# Patient Record
Sex: MALE | Race: WHITE | NOT HISPANIC OR LATINO
[De-identification: names, ages, dates, MRNs, and addresses within clinical notes are randomized per-mention and may not be internally consistent; named-entity substitution may affect disease eponyms.]

---

## 2022-11-21 PROBLEM — Z00.00 ENCOUNTER FOR PREVENTIVE HEALTH EXAMINATION: Status: ACTIVE | Noted: 2022-11-21

## 2022-11-22 ENCOUNTER — APPOINTMENT (OUTPATIENT)
Dept: INFECTIOUS DISEASE | Facility: CLINIC | Age: 52
End: 2022-11-22

## 2022-11-22 ENCOUNTER — OUTPATIENT (OUTPATIENT)
Dept: OUTPATIENT SERVICES | Facility: HOSPITAL | Age: 52
LOS: 1 days | End: 2022-11-22

## 2022-11-22 ENCOUNTER — LABORATORY RESULT (OUTPATIENT)
Age: 52
End: 2022-11-22

## 2022-11-22 VITALS
BODY MASS INDEX: 22.38 KG/M2 | HEIGHT: 75 IN | DIASTOLIC BLOOD PRESSURE: 97 MMHG | WEIGHT: 180 LBS | TEMPERATURE: 98.7 F | SYSTOLIC BLOOD PRESSURE: 145 MMHG | OXYGEN SATURATION: 98 % | RESPIRATION RATE: 14 BRPM | HEART RATE: 82 BPM

## 2022-11-22 DIAGNOSIS — Z11.3 ENCOUNTER FOR SCREENING FOR INFECTIONS WITH A PREDOMINANTLY SEXUAL MODE OF TRANSMISSION: ICD-10-CM

## 2022-11-22 DIAGNOSIS — B00.1 HERPESVIRAL VESICULAR DERMATITIS: ICD-10-CM

## 2022-11-22 DIAGNOSIS — R03.0 ELEVATED BLOOD-PRESSURE READING, W/OUT DIAGNOSIS OF HYPERTENSION: ICD-10-CM

## 2022-11-22 DIAGNOSIS — Z23 ENCOUNTER FOR IMMUNIZATION: ICD-10-CM

## 2022-11-22 PROCEDURE — 99204 OFFICE O/P NEW MOD 45 MIN: CPT | Mod: GC

## 2022-11-22 RX ORDER — PREDNISONE 10 MG/1
10 TABLET ORAL
Qty: 21 | Refills: 0 | Status: DISCONTINUED | COMMUNITY
Start: 2022-09-08

## 2022-11-22 RX ORDER — TRIAMCINOLONE ACETONIDE 1 MG/G
0.1 CREAM TOPICAL
Qty: 80 | Refills: 0 | Status: DISCONTINUED | COMMUNITY
Start: 2022-09-08

## 2022-11-22 NOTE — PHYSICAL EXAM
[PERRL] : pupils equal round and reactive to light [Supple] : supple [No Edema] : there was no peripheral edema [Grossly Normal Strength/Tone] : grossly normal strength/tone [No Rash] : no rash [Normal] : affect was normal and insight and judgment were intact

## 2022-11-23 LAB
ALBUMIN SERPL ELPH-MCNC: 4.8 G/DL
ALP BLD-CCNC: 56 U/L
ALT SERPL-CCNC: 20 U/L
ANION GAP SERPL CALC-SCNC: 14 MMOL/L
APPEARANCE: CLEAR
AST SERPL-CCNC: 22 U/L
BACTERIA: NEGATIVE
BASOPHILS # BLD AUTO: 0.03 K/UL
BASOPHILS NFR BLD AUTO: 0.5 %
BILIRUB SERPL-MCNC: 0.2 MG/DL
BILIRUBIN URINE: NEGATIVE
BLOOD URINE: NEGATIVE
BUN SERPL-MCNC: 10 MG/DL
C TRACH RRNA SPEC QL NAA+PROBE: NOT DETECTED
CALCIUM SERPL-MCNC: 9.5 MG/DL
CD3 CELLS # BLD: 1174 /UL
CD3 CELLS NFR BLD: 74 %
CD3+CD4+ CELLS # BLD: 297 /UL
CD3+CD4+ CELLS NFR BLD: 19 %
CD3+CD4+ CELLS/CD3+CD8+ CLL SPEC: 0.34 RATIO
CD3+CD8+ CELLS # SPEC: 877 /UL
CD3+CD8+ CELLS NFR BLD: 55 %
CHLORIDE SERPL-SCNC: 103 MMOL/L
CHOLEST SERPL-MCNC: 236 MG/DL
CO2 SERPL-SCNC: 24 MMOL/L
COLOR: NORMAL
CREAT SERPL-MCNC: 1.11 MG/DL
EGFR: 80 ML/MIN/1.73M2
EOSINOPHIL # BLD AUTO: 0.07 K/UL
EOSINOPHIL NFR BLD AUTO: 1.3 %
ESTIMATED AVERAGE GLUCOSE: 114 MG/DL
GLUCOSE QUALITATIVE U: NEGATIVE
GLUCOSE SERPL-MCNC: 72 MG/DL
HBA1C MFR BLD HPLC: 5.6 %
HBV CORE IGG+IGM SER QL: NONREACTIVE
HBV CORE IGM SER QL: NONREACTIVE
HBV SURFACE AB SER QL: REACTIVE
HBV SURFACE AG SER QL: NONREACTIVE
HCT VFR BLD CALC: 39.3 %
HDLC SERPL-MCNC: 46 MG/DL
HEPATITIS A IGG ANTIBODY: REACTIVE
HGB BLD-MCNC: 12.8 G/DL
HYALINE CASTS: 0 /LPF
IMM GRANULOCYTES NFR BLD AUTO: 0.2 %
KETONES URINE: NEGATIVE
LDLC SERPL CALC-MCNC: 153 MG/DL
LEUKOCYTE ESTERASE URINE: NEGATIVE
LYMPHOCYTES # BLD AUTO: 1.56 K/UL
LYMPHOCYTES NFR BLD AUTO: 28.2 %
MAN DIFF?: NORMAL
MCHC RBC-ENTMCNC: 30.5 PG
MCHC RBC-ENTMCNC: 32.6 GM/DL
MCV RBC AUTO: 93.8 FL
MICROSCOPIC-UA: NORMAL
MONOCYTES # BLD AUTO: 0.41 K/UL
MONOCYTES NFR BLD AUTO: 7.4 %
N GONORRHOEA RRNA SPEC QL NAA+PROBE: NOT DETECTED
NEUTROPHILS # BLD AUTO: 3.45 K/UL
NEUTROPHILS NFR BLD AUTO: 62.4 %
NITRITE URINE: NEGATIVE
NONHDLC SERPL-MCNC: 190 MG/DL
PH URINE: 7
PLATELET # BLD AUTO: 246 K/UL
POTASSIUM SERPL-SCNC: 4.2 MMOL/L
PROT SERPL-MCNC: 7.1 G/DL
PROTEIN URINE: NEGATIVE
RBC # BLD: 4.19 M/UL
RBC # FLD: 13.3 %
RED BLOOD CELLS URINE: 0 /HPF
SODIUM SERPL-SCNC: 141 MMOL/L
SOURCE AMPLIFICATION: NORMAL
SPECIFIC GRAVITY URINE: 1.01
SQUAMOUS EPITHELIAL CELLS: 0 /HPF
TRIGL SERPL-MCNC: 184 MG/DL
UROBILINOGEN URINE: NORMAL
WBC # FLD AUTO: 5.53 K/UL
WHITE BLOOD CELLS URINE: 0 /HPF

## 2022-11-23 NOTE — ASSESSMENT
[FreeTextEntry1] : HCM: Covid Vx x 5, last booster earlier this fall. Influenza Vx received this season. Monkeypox Vx x 2. Colonoscopy 12/2021 WNL repeat in 10 years. \par - PNA 20 valent administered today\par - Anal Pap at next visit

## 2022-11-23 NOTE — HISTORY OF PRESENT ILLNESS
[FreeTextEntry1] : New patient evaluation [de-identified] : 52M with PMH of HIV, HCV s/p treatment, herpes labialis, hairloss. Patient initially diagnoses in 1997, participated in many clinical trials. Patient notes to have been VLUD since 2 weeks after diagnosis of HIV. Per patient last labs with VLUD and CD4 count in the mid 300s. Patient was previously on atripla for many years with appropriate response, until being transitioned to Biktarvy 6 months ago. Since that time patient feels weight gain has been an issue. patient notes to have been treated for HCV in 2004, recalls being told that he had seroconverted, and that some degree of fibrosis had occurred prior to clearance. Denies ROS. \par \par Social History: patient recently moved back to [facility] from [facility], works as a consultant. Never smoker. Denies any passed drug use. Reports having 5 partners in the last 6 months, rarely uses protection. Consumes alcohol 4x per week. \par \par HCM: Covid Vx x 5, last booster earlier this fall. Monkeypox Vx x 2. Colonoscopy 12/2021 WNL repeat in 10 years.

## 2022-11-23 NOTE — REVIEW OF SYSTEMS
[Negative] : Psychiatric [Discharge] : no discharge [Pain] : no pain [Vision Problems] : no vision problems [Itching] : no itching [Hearing Loss] : no hearing loss [Nasal Discharge] : no nasal discharge [Sore Throat] : no sore throat

## 2022-11-23 NOTE — HEALTH RISK ASSESSMENT
[Never] : Never [Yes] : Yes [2 - 3 times a week (3 pts)] : 2 - 3  times a week (3 points) [1 or 2 (0 pts)] : 1 or 2 (0 points) [Never (0 pts)] : Never (0 points) [0] : 2) Feeling down, depressed, or hopeless: Not at all (0) [PHQ-2 Negative - No further assessment needed] : PHQ-2 Negative - No further assessment needed [Patient reported colonoscopy was normal] : Patient reported colonoscopy was normal [[id number]] : 0 [ColonoscopyDate] : 10/2021

## 2022-11-23 NOTE — END OF VISIT
[] : Resident [FreeTextEntry3] : I saw and evaluated the patient with resident. \par I performed a medication reconciliation and  reviewed vaccination history and other health care maintenance and prevention topics in the EMR.\par I assessed patient's adherence to medications especially ARVs ( % 100 ) and inquired about AEs ( None).\par \par Interested in [place] injectable ARVs\par labs- 3 site\par Declined pap\par RTC in 2 weeks\par \par \par \par

## 2022-11-25 DIAGNOSIS — R03.0 ELEVATED BLOOD-PRESSURE READING, WITHOUT DIAGNOSIS OF HYPERTENSION: ICD-10-CM

## 2022-11-25 DIAGNOSIS — B00.1 HERPESVIRAL VESICULAR DERMATITIS: ICD-10-CM

## 2022-11-25 DIAGNOSIS — B19.20 UNSPECIFIED VIRAL HEPATITIS C WITHOUT HEPATIC COMA: ICD-10-CM

## 2022-11-25 DIAGNOSIS — Z23 ENCOUNTER FOR IMMUNIZATION: ICD-10-CM

## 2022-11-25 DIAGNOSIS — Z11.3 ENCOUNTER FOR SCREENING FOR INFECTIONS WITH A PREDOMINANTLY SEXUAL MODE OF TRANSMISSION: ICD-10-CM

## 2022-11-25 DIAGNOSIS — B20 HUMAN IMMUNODEFICIENCY VIRUS [HIV] DISEASE: ICD-10-CM

## 2022-11-29 LAB
HCV RNA SERPL NAA+PROBE-LOG IU: NOT DETECTED LOGIU/ML
HEPC RNA INTERP: NOT DETECTED
HIV1 RNA # SERPL NAA+PROBE: NORMAL
HIV1 RNA # SERPL NAA+PROBE: NORMAL COPIES/ML
M TB IFN-G BLD-IMP: NEGATIVE
QUANTIFERON TB PLUS MITOGEN MINUS NIL: 9.7 IU/ML
QUANTIFERON TB PLUS NIL: 0.63 IU/ML
QUANTIFERON TB PLUS TB1 MINUS NIL: -0.03 IU/ML
QUANTIFERON TB PLUS TB2 MINUS NIL: -0.07 IU/ML
RPR SER-TITR: NORMAL
VIRAL LOAD INTERP: NORMAL
VIRAL LOAD LOG: NORMAL LG COP/ML

## 2022-11-30 ENCOUNTER — TRANSCRIPTION ENCOUNTER (OUTPATIENT)
Age: 52
End: 2022-11-30

## 2022-12-05 ENCOUNTER — OUTPATIENT (OUTPATIENT)
Dept: OUTPATIENT SERVICES | Facility: HOSPITAL | Age: 52
LOS: 1 days | End: 2022-12-05

## 2022-12-05 ENCOUNTER — APPOINTMENT (OUTPATIENT)
Dept: INFECTIOUS DISEASE | Facility: CLINIC | Age: 52
End: 2022-12-05

## 2022-12-05 PROCEDURE — 99213 OFFICE O/P EST LOW 20 MIN: CPT | Mod: 95

## 2022-12-05 RX ORDER — BICTEGRAVIR SODIUM, EMTRICITABINE, AND TENOFOVIR ALAFENAMIDE FUMARATE 50; 200; 25 MG/1; MG/1; MG/1
50-200-25 TABLET ORAL
Refills: 0 | Status: DISCONTINUED | COMMUNITY
Start: 2022-01-27 | End: 2022-12-05

## 2022-12-06 ENCOUNTER — TRANSCRIPTION ENCOUNTER (OUTPATIENT)
Age: 52
End: 2022-12-06

## 2022-12-06 NOTE — PLAN
[FreeTextEntry1] : 52M presents via  telehealth to discuss ARVs.\par \par HIV: Controlled.  Possibly having weight gain from TAF/INSTI.  Discussed options including switching from INSTI regimen to SEBAS based regimen.  But this will remove ASCENCIO as option.  Will trial Juluca to remove TAF/INSTI combo.  Monitor weight on DTG/RPV for 2-3 months.  If improved, will consider switch to ASCENCIO.  If not, will  switch to Descovy / SEBAS.\par \par Counseled on AM fatigue,  will monitor.\par Reviewed labs.\par \par I spent 25 minutes for the total time of this encounter, including time spent face-to-face with the patient, chart review, coordinating care, and documentation.

## 2022-12-06 NOTE — PHYSICAL EXAM
[No Acute Distress] : no acute distress [Well Developed] : well developed [Well-Appearing] : well-appearing [No Respiratory Distress] : no respiratory distress  [No Accessory Muscle Use] : no accessory muscle use [de-identified] : A&Ox3

## 2022-12-06 NOTE — HISTORY OF PRESENT ILLNESS
[Home] : at home, [unfilled] , at the time of the visit. [Medical Office: ([facility])___] : at the medical office located in  [Verbal consent obtained from patient] : the patient, [unfilled] [FreeTextEntry1] : HIV follow up [de-identified] : Discussed with patient: You have chosen to receive care through the use of tele-media. Tele-media enables health care providers at different locations to provide safe, effective, and convenient care through the use of technology. Please note this is a billable encounter. As with any health care service, there are risks associated with the use of tele-media, including equipment failure, poor image and/or resolution, and  issues. You understand that I cannot physically examine you and that you may need to come to the clinic to complete the assessment. Patient agreed verbally to understanding the risks and benefits of tele-media as explained. All questions regarding tele-media encounters were answered. \par  \par 52M presents via video call to discuss HIV labs and ARV options.\par \par On Biktarvy after switching  from Atripla.  Has noticed mid-section weight gain.  Tolerated Atripla  well prev.  No other acute adverse effects from  Biktarvy.  Has tried exercise, has clean diet, cannot lose the weight.\par \par Intersted in ASCENCIO.\par \par Also concerned about feeling  mild fatigue in mid-morning.  No other associated symptoms

## 2022-12-15 ENCOUNTER — NON-APPOINTMENT (OUTPATIENT)
Age: 52
End: 2022-12-15

## 2023-02-06 ENCOUNTER — OUTPATIENT (OUTPATIENT)
Dept: OUTPATIENT SERVICES | Facility: HOSPITAL | Age: 53
LOS: 1 days | End: 2023-02-06

## 2023-02-06 ENCOUNTER — APPOINTMENT (OUTPATIENT)
Dept: INFECTIOUS DISEASE | Facility: CLINIC | Age: 53
End: 2023-02-06
Payer: COMMERCIAL

## 2023-02-06 VITALS
WEIGHT: 180 LBS | RESPIRATION RATE: 12 BRPM | TEMPERATURE: 98.4 F | HEIGHT: 75 IN | BODY MASS INDEX: 22.38 KG/M2 | DIASTOLIC BLOOD PRESSURE: 78 MMHG | SYSTOLIC BLOOD PRESSURE: 130 MMHG | OXYGEN SATURATION: 96 % | HEART RATE: 90 BPM

## 2023-02-06 PROCEDURE — 99214 OFFICE O/P EST MOD 30 MIN: CPT

## 2023-02-07 DIAGNOSIS — B20 HUMAN IMMUNODEFICIENCY VIRUS [HIV] DISEASE: ICD-10-CM

## 2023-02-07 LAB
HIV1 RNA # SERPL NAA+PROBE: NORMAL
HIV1 RNA # SERPL NAA+PROBE: NORMAL COPIES/ML
VIRAL LOAD INTERP: NORMAL
VIRAL LOAD LOG: NORMAL LG COP/ML

## 2023-02-07 NOTE — PLAN
[FreeTextEntry1] : 52M presents for follow up\par \par HIV: Controlled.  Needs VL today to assess efficacy of DTG/RPV.  Weight improved w/ removal of TAF.  C/w monitoring.  No adverse effects noted.  Counseled on Cabenuva (logistics, risks/benefits).  Discussed insurance coverage, patient's [place] MCD will be active next month.\par \par Weight stable but w/ better fat distribution.\par \par RTC 1-2 months pending insurance auth for Cabenuva.\par \par I spent 35 minutes for the total time of this encounter, including time spent face-to-face with the patient, chart review, coordinating care, and documentation.

## 2023-02-07 NOTE — HISTORY OF PRESENT ILLNESS
[FreeTextEntry1] : HIV follow up [de-identified] : 52M presents for follow up\par \par Tolerating Juluca well.  Reports that weight dropped off after stopping Biktarvy.\par No mood or other adverse effects.  Feels well.  Interested in ASCENCIO pending insurance change.\par \par No acute complaints today.

## 2023-03-03 ENCOUNTER — TRANSCRIPTION ENCOUNTER (OUTPATIENT)
Age: 53
End: 2023-03-03

## 2023-03-07 ENCOUNTER — TRANSCRIPTION ENCOUNTER (OUTPATIENT)
Age: 53
End: 2023-03-07

## 2023-03-10 ENCOUNTER — APPOINTMENT (OUTPATIENT)
Dept: INFECTIOUS DISEASE | Facility: CLINIC | Age: 53
End: 2023-03-10
Payer: COMMERCIAL

## 2023-03-10 ENCOUNTER — OUTPATIENT (OUTPATIENT)
Dept: OUTPATIENT SERVICES | Facility: HOSPITAL | Age: 53
LOS: 1 days | End: 2023-03-10

## 2023-03-10 VITALS
WEIGHT: 180 LBS | OXYGEN SATURATION: 98 % | SYSTOLIC BLOOD PRESSURE: 120 MMHG | BODY MASS INDEX: 22.38 KG/M2 | HEART RATE: 96 BPM | TEMPERATURE: 97.2 F | DIASTOLIC BLOOD PRESSURE: 83 MMHG | HEIGHT: 75 IN

## 2023-03-10 PROCEDURE — 99214 OFFICE O/P EST MOD 30 MIN: CPT

## 2023-03-10 RX ORDER — CABOTEGRAVIR AND RILPIVIRINE 600-900/3
600 & 900 KIT INTRAMUSCULAR
Qty: 0 | Refills: 0 | Status: COMPLETED | OUTPATIENT
Start: 2023-03-10

## 2023-03-10 RX ORDER — DOLUTEGRAVIR SODIUM AND RILPIVIRINE HYDROCHLORIDE 50; 25 MG/1; MG/1
50-25 TABLET, FILM COATED ORAL
Qty: 30 | Refills: 1 | Status: DISCONTINUED | COMMUNITY
Start: 2022-12-05 | End: 2023-03-10

## 2023-03-10 RX ORDER — CABOTEGRAVIR SODIUM 30 MG/1
30 TABLET, FILM COATED ORAL
Qty: 30 | Refills: 0 | Status: DISCONTINUED | COMMUNITY
Start: 2023-03-07 | End: 2023-03-10

## 2023-03-10 RX ORDER — RILPIVIRINE HYDROCHLORIDE 25 MG/1
25 TABLET, FILM COATED ORAL
Qty: 30 | Refills: 0 | Status: DISCONTINUED | COMMUNITY
Start: 2023-03-07 | End: 2023-03-10

## 2023-03-10 RX ADMIN — CABOTEGRAVIR AND RILPIVIRINE 0 MG/3ML: KIT at 00:00

## 2023-03-13 DIAGNOSIS — L23.9 ALLERGIC CONTACT DERMATITIS, UNSPECIFIED CAUSE: ICD-10-CM

## 2023-03-13 DIAGNOSIS — B20 HUMAN IMMUNODEFICIENCY VIRUS [HIV] DISEASE: ICD-10-CM

## 2023-03-13 LAB
HIV1 RNA # SERPL NAA+PROBE: ABNORMAL
HIV1 RNA # SERPL NAA+PROBE: ABNORMAL COPIES/ML
VIRAL LOAD INTERP: NORMAL
VIRAL LOAD LOG: ABNORMAL LG COP/ML

## 2023-03-29 ENCOUNTER — TRANSCRIPTION ENCOUNTER (OUTPATIENT)
Age: 53
End: 2023-03-29

## 2023-04-06 ENCOUNTER — RX RENEWAL (OUTPATIENT)
Age: 53
End: 2023-04-06

## 2023-04-07 ENCOUNTER — APPOINTMENT (OUTPATIENT)
Dept: INFECTIOUS DISEASE | Facility: CLINIC | Age: 53
End: 2023-04-07
Payer: COMMERCIAL

## 2023-04-07 ENCOUNTER — OUTPATIENT (OUTPATIENT)
Dept: OUTPATIENT SERVICES | Facility: HOSPITAL | Age: 53
LOS: 1 days | End: 2023-04-07

## 2023-04-07 VITALS
HEART RATE: 88 BPM | RESPIRATION RATE: 12 BRPM | DIASTOLIC BLOOD PRESSURE: 76 MMHG | SYSTOLIC BLOOD PRESSURE: 113 MMHG | BODY MASS INDEX: 22.01 KG/M2 | HEIGHT: 75 IN | WEIGHT: 177 LBS | TEMPERATURE: 96.9 F | OXYGEN SATURATION: 97 %

## 2023-04-07 DIAGNOSIS — L23.9 ALLERGIC CONTACT DERMATITIS, UNSPECIFIED CAUSE: ICD-10-CM

## 2023-04-07 PROCEDURE — 99214 OFFICE O/P EST MOD 30 MIN: CPT

## 2023-04-07 RX ORDER — CABOTEGRAVIR AND RILPIVIRINE 600-900/3
600 & 900 KIT INTRAMUSCULAR
Qty: 0 | Refills: 0 | Status: COMPLETED | OUTPATIENT
Start: 2023-04-07

## 2023-04-07 RX ADMIN — CABOTEGRAVIR AND RILPIVIRINE 0 MG/3ML: KIT at 00:00

## 2023-04-10 DIAGNOSIS — L23.9 ALLERGIC CONTACT DERMATITIS, UNSPECIFIED CAUSE: ICD-10-CM

## 2023-04-10 DIAGNOSIS — B20 HUMAN IMMUNODEFICIENCY VIRUS [HIV] DISEASE: ICD-10-CM

## 2023-04-12 ENCOUNTER — TRANSCRIPTION ENCOUNTER (OUTPATIENT)
Age: 53
End: 2023-04-12

## 2023-04-13 ENCOUNTER — TRANSCRIPTION ENCOUNTER (OUTPATIENT)
Age: 53
End: 2023-04-13

## 2023-04-21 ENCOUNTER — APPOINTMENT (OUTPATIENT)
Dept: PHYSICAL MEDICINE AND REHAB | Facility: CLINIC | Age: 53
End: 2023-04-21
Payer: COMMERCIAL

## 2023-04-21 VITALS
HEIGHT: 75 IN | SYSTOLIC BLOOD PRESSURE: 113 MMHG | DIASTOLIC BLOOD PRESSURE: 71 MMHG | WEIGHT: 177 LBS | BODY MASS INDEX: 22.01 KG/M2 | HEART RATE: 122 BPM | OXYGEN SATURATION: 96 %

## 2023-04-21 PROCEDURE — 99205 OFFICE O/P NEW HI 60 MIN: CPT

## 2023-04-21 NOTE — ASSESSMENT
[FreeTextEntry1] :                                                       Assessment/Plan:\par \par TYLER MARADIAGA is a 52 year male with chronic back pain here for initial consultation.\par \par Sacroiliac joint dysfunction, right\par Tendinopathy of the gluteus medius, B/L\par Hamstring tightness, B/L\par Chronic lumbar radiculopathy, L5, B/L\par Intervertebral disc disorder, Lumbar\par Lumbosacral spondylosis without spondylosis\par \par - Tiers of treatment and management of above diagnosis(es) were discussed with patient\par - Optimal diet, weight, sleep, and lifestyle management to minimize stress and maximize well being counseling provided\par - Imaging reviewed and discussed with patient\par - Reviewed previous encounter notes from 4/7/2023 Dr. HEIDI Beltran (Internal Medicine)\par - Patient was advised to start a structured, targeted therapy program 2-3x/wk for 8 wks with goal toward HEP\par - Patient was educated on an appropriate home exercise program, provided with exercise recommendations, all questions answered\par - Patient was prescribed methocarbamol 750mg 1-2 tabs up to TID PRN muscle spasm, informed of sedative potential, advised to avoid driving, taking the subway, or operating heavy machinery, patient displayed a clear understanding of the plan including medication, its purpose and side effects, all questions answered\par - Patient was advised to apply cool compresses or warm heat to affected regions PRN\par - Patient may trial topical compound cream to the midline low back no more than twice per day as needed for next 2-3 weeks, Rx entered via portal, written information provided to the patient in addition to verbal explanation regarding the medication\par - Radiographs of low back, hips, pelvis ordered today \par \par - Patient was prescribed medrol dose pack (x1) with written instructions, all questions answered, informed of side effects of the medication.  Possible side effects, including hyperglycemia, GI upset, and GI bleed, reviewed with patient. In agreement with patient that potential pain reduction and anti-inflammatory benefits currently outweigh known side effect profile for oral corticosteroids. Patient instructed to immediately stop medication should she develop any abdominal pain, nausea, vomiting, bloody stools, or BRBPR\par \par - Educated about red flag symptoms including (but not limited to) new, worsened, or persistent: fever greater than 100F, bowel or bladder incontinence, bowel obstipation, inability to void urine, urinary leakage, Severe nausea or vomiting, Worsening numbness, worsening tingling/paresthesias, and/or new or progressive motor weakness; advised to seek immediate medical attention at his nearest Emergency department should they experience any of the above\par \par - Follow up in 2-3 weeks after imaging, in person in 2 months to assess their progress\par \par I have personally spent a total of at least 65 minutes preparing, reviewing internal and external records, explaining, counseling, and coordinating care for this patient encounter.\par \[facility] Thank you, (s), for allowing me to participate in the care of your patient. Please do not hesitate to contact me with questions/concerns.\par \[facility] Narendra Carlos M.D.\[facility] Sports and Interventional Spine\[facility] Department of Physical Medicine and Rehabilitation \[facility] \[facility] Email: [email or web address]\par \par [facility]\par [facility] [address]\par [facility], 11th Floor\[place] [address]\[facility] \[facility] Appointments: [phone]\[facility] Fax: [phone]\[facility]

## 2023-04-21 NOTE — PHYSICAL EXAM
[FreeTextEntry1] : Gen: A+O x 3 in NAD\par Psych: Normal mood and affect. Responds appropriately to commands\par Eyes: Anicteric. No discharge. EOMI.\par Resp: Breathing unlabored\par CV: DP pulses 2+ and equal. No varicosities noted\par Ext: No c/c/e\par Skin: No lesions noted\par \par Gait:\par Non antalgic \par +  reciprocating heel to toe\par able to stand on toes and heels WITHout hand holding\par Tandem gait intact WITHout hand holding\par Able to stand on either lower limb without LOB for 15 seconds, more difficult with left stance leg\par Romberg negative\par 10 calf raises on both extremities, same effort B/L\par \par Neg Trendelenburg sign with single leg stance\par \par Inspection: Spine alignment is midline.\par Palpation: There is + tenderness over the midline spinous processes, paravertebral muscles of the thoracolumbar region\par Lumbar ROM: Flexion, extension, side-bending, rotation, limited in most planes\par ++ pain with lateral flexionright > left\par + pain with oblique extension to the right\par + pain with lateral rotation \par \par Hip ROM: neg pain at terminal ROM bilaterally.\par FAIR, FABERE negative bilaterally.\par \par 	Hip Flex       Knee Ext      Ankle Dorsi           EHL        Ankle Plantar\par Right	5/5	        5/5	                 5/5	          4-/5	             5/5                           \par Left	5/5	        5/5	                 5/5	          4-/5	             5/5                           \par \par Hip abduction R 4+/5 L 4/5\par Hip adduction R 4+/5 L 4/5\par Hip extension R 4+/5 L 4/5\par Knee Flexion R 4+/5 L 4/5\par \par Tone: Normal. No clonus.\par Sensation: Grossly intact to light touch bilateral lower limbs.\par Proprioception: Intact at big toes bilaterally.\par Reflexes: 2+ symmetric knee jerk, ankle jerk. \par Plantars downgoing bilaterally.\par SLR negative bilaterally\par Crossed SLR negative bilaterally.\par Slump Test negative bilaterally\par \par prone gapping test + B/L \par yeoman + B L\par nachlas+ B/L\par active hip extension was more difficult on neither side\par + nghia B/L\par rossy finger + on two attempts\par

## 2023-04-21 NOTE — HISTORY OF PRESENT ILLNESS
[FreeTextEntry1] : Narendra Carlos M.D.\par Sports Medicine and Interventional Spine\[facility] Department of Physical Medicine and Rehabilitation \[facility] \[facility] Email: [email or web address] <[email or web address]>\par \par [facility] Physician [facility] [place] [facility]\par [address]\par [place], 11th Floor\[place] [address]\par \par [facility] Physician [facility] [place] [facility]\par [address], 4th Floor\[place] [address]\par \[facility]  \par [facility]\par [address] 6[address]\par [place], [place] [address] \[facility] [place] Appointments\par Phone: [phone]\par Fax: [phone]\par \par ----------------------------------------------------------------------------------------------------------------------------------------\par \par PATIENT: TYLER MARADIAGA \par MRN: [id number] \par YOB: 1970 \par DATE OF SERVICE: 04/21/2023 \par \par Apr 21, 2023 \[facility] \par \par Dear Drs.\par \par Thank you for referring TYLER MARADIAGA to my Sports and Interventional Spine practice and office. Enclosed is a copy of the patient's consultation/progress note, which includes my complete assessment and recent studies completed during the patient's evaluation.\par \par If you have questions or have any patients who require nonsurgical, non-opiate management of any sports, spine, or musculoskeletal conditions, please do not hesitate to contact my , Ade Haskins at [phone].\par \par I look forward to taking care of your patients along with you.\par \par Sincerely,\par \par Narendra\par \par Narendra Carlos MD\par Sports, Interventional Spine, & Regenerative Musculoskeletal Medicine\par [facility] [place] [facility]\par Email: [email or web address]\par \par \par                                                   Initial Consultation:\par CC: low back pain \par \par HPI:  This is the first visit to [facility]'s [facility] [place] [facility] Sports Medicine and Interventional Spine Practice.  \par \par TYLER MARADIAGA presents with the chief complaint as above.  \par \par Initial Hx on 04/21/2023 :\par Presents to [place]\par patient was helping perform yard work one particular occasion 9/15/2022 when the pain started\par The patient’s difficulties began 9/15/2022\par patient noticed the pain immediately, started relative rest\par patient has been unable to find relief with chiropractic treatments, acupuncturists as well\par patient has tried e-stim with zero lasting relief \par The pain is graded as 7-8/10\par The pain is described as "strained muscle pain and deep misaligned lower spine"\par right low back believes they are 1-2 "pops" away from relief\par The pain is intermittent\par The pain does not radiate\par markedly limited with forward flexion, feels that they can only perform thoracolumbar forward flexion when counterbalanced\par The patient feels that the pain is overall persistent \par Patient denies other recent fall, MVA, injury, trauma, or accident besides presenting history above\par \par Aggravating: everyday activity, rotation while carrying load, sit to stand transitional movements, \par Alleviating: rest, activity modification, avoiding compound lifts, pharmacologic treatments\par \par Meds: denies regular PO pain medications\par Therapy Program: no recent structured targeted therapy program\par HEP: doing HEP regularly\par \par Assoc Sx:\par Denies numbness, Tingling\par Denies Focal motor weakness in the upper or lower limbs\par Denies New or worsened bowel or bladder incontinence\par Denies Saddle anesthesia\par Denies Using Orthotic(s)/Supportive devices\par Denies Swelling in the upper/lower extremities\par They also deny frequent tripping, falling\par \par ROS: A 14 point review of systems was completed. Positive findings are pain as described above. The remaining systems negative.\par \par Prostate Hx: up to date\par COVID HX: reviewed\par \par Assoc Hx:\par patient is currently able to exercise despite their pain, ailment\par Ambulates without assistive device\par Injection Hx: denies locally directed treatment to the area in question\par Imaging Hx: reviewed\par \par Level of functioning: indep with ambulation, indep with ADLs\par Living Situation: dwelling with steps to enter

## 2023-04-26 ENCOUNTER — APPOINTMENT (OUTPATIENT)
Dept: PHYSICAL MEDICINE AND REHAB | Facility: CLINIC | Age: 53
End: 2023-04-26

## 2023-05-09 ENCOUNTER — TRANSCRIPTION ENCOUNTER (OUTPATIENT)
Age: 53
End: 2023-05-09

## 2023-05-11 ENCOUNTER — APPOINTMENT (OUTPATIENT)
Dept: PHYSICAL MEDICINE AND REHAB | Facility: CLINIC | Age: 53
End: 2023-05-11
Payer: COMMERCIAL

## 2023-05-11 PROCEDURE — 99443: CPT

## 2023-05-26 ENCOUNTER — APPOINTMENT (OUTPATIENT)
Dept: MRI IMAGING | Facility: CLINIC | Age: 53
End: 2023-05-26
Payer: COMMERCIAL

## 2023-05-26 ENCOUNTER — OUTPATIENT (OUTPATIENT)
Dept: OUTPATIENT SERVICES | Facility: HOSPITAL | Age: 53
LOS: 1 days | End: 2023-05-26

## 2023-05-26 PROCEDURE — 72148 MRI LUMBAR SPINE W/O DYE: CPT | Mod: 26

## 2023-06-05 ENCOUNTER — APPOINTMENT (OUTPATIENT)
Dept: INFECTIOUS DISEASE | Facility: CLINIC | Age: 53
End: 2023-06-05
Payer: COMMERCIAL

## 2023-06-05 ENCOUNTER — OUTPATIENT (OUTPATIENT)
Dept: OUTPATIENT SERVICES | Facility: HOSPITAL | Age: 53
LOS: 1 days | End: 2023-06-05

## 2023-06-05 PROCEDURE — 99211 OFF/OP EST MAY X REQ PHY/QHP: CPT | Mod: 25

## 2023-06-05 RX ORDER — CABOTEGRAVIR AND RILPIVIRINE 600-900/3
600 & 900 KIT INTRAMUSCULAR
Qty: 0 | Refills: 0 | Status: COMPLETED | OUTPATIENT
Start: 2023-06-05

## 2023-06-06 DIAGNOSIS — B20 HUMAN IMMUNODEFICIENCY VIRUS [HIV] DISEASE: ICD-10-CM

## 2023-06-06 LAB
CD3 CELLS # BLD: 1329 CELLS/UL
CD3 CELLS NFR BLD: 72 %
CD3+CD4+ CELLS # BLD: 316 CELLS/UL
CD3+CD4+ CELLS NFR BLD: 17 %
CD3+CD4+ CELLS/CD3+CD8+ CLL SPEC: 0.31 RATIO
CD3+CD8+ CELLS # SPEC: 1010 CELLS/UL
CD3+CD8+ CELLS NFR BLD: 55 %
HIV1 RNA # SERPL NAA+PROBE: ABNORMAL
HIV1 RNA # SERPL NAA+PROBE: ABNORMAL COPIES/ML
VIRAL LOAD INTERP: NORMAL
VIRAL LOAD LOG: ABNORMAL LG COP/ML

## 2023-06-23 ENCOUNTER — APPOINTMENT (OUTPATIENT)
Dept: PHYSICAL MEDICINE AND REHAB | Facility: CLINIC | Age: 53
End: 2023-06-23
Payer: COMMERCIAL

## 2023-06-23 VITALS
OXYGEN SATURATION: 100 % | HEART RATE: 75 BPM | HEIGHT: 75 IN | BODY MASS INDEX: 22.01 KG/M2 | DIASTOLIC BLOOD PRESSURE: 87 MMHG | WEIGHT: 177 LBS | SYSTOLIC BLOOD PRESSURE: 130 MMHG

## 2023-06-23 PROCEDURE — 99215 OFFICE O/P EST HI 40 MIN: CPT

## 2023-06-28 NOTE — ASSESSMENT
[FreeTextEntry1] :                                                       Assessment/Plan:\par \par TYLER MARADIAGA is a 52 year male with chronic back pain here for follow up\par \par Sacroiliac joint dysfunction, right\par Tendinopathy of the gluteus medius, B/L\par Hamstring tightness, B/L\par Chronic lumbar radiculopathy, L5, B/L\par Intervertebral disc disorder, Lumbar\par Lumbosacral spondylosis without spondylosis\par \par - Tiers of treatment and management of above diagnosis(es) were discussed with patient\par - Optimal diet, weight, sleep, and lifestyle management to minimize stress and maximize well being counseling provided\par - Imaging reviewed and discussed with patient\par - Reviewed previous encounter notes from 4/7/2023 Dr. HEIDI Beltran (Internal Medicine)\par - Patient was advised to resume a structured, targeted therapy program 2-3x/wk for 8 wks with goal toward HEP\par - Patient was educated on an appropriate home exercise program, provided with exercise recommendations, all questions answered\par - Patient was prescribed methocarbamol 750mg 1-2 tabs up to TID PRN muscle spasm, informed of sedative potential, advised to avoid driving, taking the subway, or operating heavy machinery, patient displayed a clear understanding of the plan including medication, its purpose and side effects, all questions answered\par - Patient was advised to apply cool compresses or warm heat to affected regions PRN\par - Patient may trial topical compound cream to the midline low back no more than twice per day as needed for next 2-3 weeks, Rx entered via portal, written information provided to the patient in addition to verbal explanation regarding the medication\par - Radiographs of low back, hips, pelvis reviewed from 4/2023\par \par - CT lumbar spine without contrast is indicated given that the pt has not improved with tylenol, ibuprofen, naproxen, meloxicam, they obtained non-diagnostic radiographs, near complete loss of L3/4 disc with endplate stress on lumbar MRI, r/o fracture, and physical therapy/home exercise program>6 weeks. Patient's imaging is medically necessary to outline targets for locally (interventional) directed treatments and/or guide surgical management.\par \par - Educated about red flag symptoms including (but not limited to) new, worsened, or persistent: fever greater than 100F, bowel or bladder incontinence, bowel obstipation, inability to void urine, urinary leakage, Severe nausea or vomiting, Worsening numbness, worsening tingling/paresthesias, and/or new or progressive motor weakness; advised to seek immediate medical attention at his nearest Emergency department should they experience any of the above\par \par - Follow up in 2-3 weeks after imaging\par \par I have personally spent a total of at least 45 minutes preparing, reviewing internal and external records, explaining, counseling, and coordinating care for this patient encounter.\par \[facility] Thank you, (s), for allowing me to participate in the care of your patient. Please do not hesitate to contact me with questions/concerns.\[facility] \[facility] Narendra Carlos M.D.\[facility] Sports and Interventional Spine\[facility] Department of Physical Medicine and Rehabilitation \[facility] \[facility] Email: [email or web address]\par \par [facility] Orth[facility] [place] [facility] [address]\[facility], 11th [place] [address]\[facility] \[facility] Appointments: [phone]\[facility] Fax: [phone]\[facility]

## 2023-06-28 NOTE — HISTORY OF PRESENT ILLNESS
[FreeTextEntry1] : Narendra Carlos M.D.\par Sports Medicine and Interventional Spine\[facility] Department of Physical Medicine and Rehabilitation \[facility] \[facility] Email: [email or web address] <[email or web address]>\par \par [facility] Physician [facility]\par [facility] [place] [facility]\par [address] [place], [address]th Floor\[place] [address]\par \par [facility] Physician [facility] [place] [facility]\par [address], 4th Floor\[place] [address]\par \par [facility]  \par [facility]\par [address], 6th [address]\par [place], [place] [address] \[facility] For [place] Appointments\par Phone: [phone]\par Fax: [phone]\par \par ----------------------------------------------------------------------------------------------------------------------------------------\par \par PATIENT: TYLER MARADIAGA \par MRN: [id number] \par YOB: 1970 \par DATE OF SERVICE: 6/23/2023\par \par Jun 23, 2023 \par \par \par Dear Drs.\par \par Thank you for referring TYLER MARADIAGA to my Sports and Interventional Spine practice and office. Enclosed is a copy of the patient's consultation/progress note, which includes my complete assessment and recent studies completed during the patient's evaluation.\par \par If you have questions or have any patients who require nonsurgical, non-opiate management of any sports, spine, or musculoskeletal conditions, please do not hesitate to contact my , Ade Haskins at [phone].\par \par I look forward to taking care of your patients along with you.\par \par Sincerely,\par \par Narendra\par \par Narendra Carlos MD\par Sports, Interventional Spine, & Regenerative Musculoskeletal Medicine\par [facility] [place] [facility]\par Email: [email or web address]\par \par \par                                                   Follow up Visit\par CC: low back pain \par \par HPI:  This is a follow up visit to [facility] [place] [facility] Sports Medicine and Interventional Spine Practice.  \par \par TYLER MARADIAGA presents with the chief complaint as above.  \par \par Interval Hx on Jun 23, 2023: presents for follow up. Since last visit, they underwent further diagnostic workup including additional imaging studies. Patient informed of all relevant imaging findings, all questions were answered including: Multilevel lumbar spondylitic changes as above notable for mild to moderate bilateral foraminal stenosis at L3-L4, L4-5 L5-S1, involving exiting L3, L4, L5 nerve roots, respectively and mild to moderate bilateral lateral recess stenosis at L3-L4 involving descending L4 nerve roots. No significant canal stenosis of the lumbar spine.\par There have been no significant changes to their aggravating or alleviating factors since the last visit. \par Since the last visit, they relate improvements in pain previously associated with known exacerbating, aggravating factors and situations. Pharmacologic treatments now include OTC analgesics PRN, otherwise pharmacologic treatments are minimal. Denies new or worsened numbness, tingling, or focal motor deficit. Denies interval fall, accident, or injury. Denies change in bowel or bladder functioning.\par \par Meds: denies regular PO pain medications\par Therapy Program: no recent structured targeted therapy program\par HEP: doing HEP regularly\par \par Assoc Sx:\par Denies numbness, Tingling\par Denies Focal motor weakness in the upper or lower limbs\par Denies New or worsened bowel or bladder incontinence\par Denies Saddle anesthesia\par Denies Using Orthotic(s)/Supportive devices\par Denies Swelling in the upper/lower extremities\par They also deny frequent tripping, falling\par \par ROS: A 14 point review of systems was completed. Positive findings are pain as described above. The remaining systems negative.\par \par Prostate Hx: up to date\par COVID HX: reviewed\par \par Initial Hx on 04/21/2023: Presents to [facility] [place]. patient was helping perform yard work one particular occasion 9/15/2022 when the pain started. The patient’s difficulties began 9/15/2022. patient noticed the pain immediately, started relative rest. patient has been unable to find relief with chiropractic treatments, acupuncturists as well\par patient has tried e-stim with zero lasting relief. The pain is graded as 7-8/10. The pain is described as "strained muscle pain and deep misaligned lower spine". right low back believes they are 1-2 "pops" away from relief\par The pain is intermittent. . The pain does not radiate. markedly limited with forward flexion, feels that they can only perform thoracolumbar forward flexion when counterbalanced. The patient feels that the pain is overall persistent \par Patient denies other recent fall, MVA, injury, trauma, or accident besides presenting history above. Aggravating: everyday activity, rotation while carrying load, sit to stand transitional movements. Alleviating: rest, activity modification, avoiding compound lifts, pharmacologic treatments\par \par Assoc Hx:\par patient is currently able to exercise despite their pain, ailment\par Ambulates without assistive device\par Injection Hx: denies locally directed treatment to the area in question\par Imaging Hx: reviewed\par \par Level of functioning: indep with ambulation, indep with ADLs\par Living Situation: dwelling with steps to enter

## 2023-07-07 ENCOUNTER — APPOINTMENT (OUTPATIENT)
Dept: CT IMAGING | Facility: CLINIC | Age: 53
End: 2023-07-07

## 2023-07-21 ENCOUNTER — TRANSCRIPTION ENCOUNTER (OUTPATIENT)
Age: 53
End: 2023-07-21

## 2023-07-25 ENCOUNTER — TRANSCRIPTION ENCOUNTER (OUTPATIENT)
Age: 53
End: 2023-07-25

## 2023-07-31 ENCOUNTER — OUTPATIENT (OUTPATIENT)
Dept: OUTPATIENT SERVICES | Facility: HOSPITAL | Age: 53
LOS: 1 days | End: 2023-07-31

## 2023-07-31 ENCOUNTER — APPOINTMENT (OUTPATIENT)
Dept: CT IMAGING | Facility: CLINIC | Age: 53
End: 2023-07-31
Payer: COMMERCIAL

## 2023-07-31 ENCOUNTER — APPOINTMENT (OUTPATIENT)
Dept: INFECTIOUS DISEASE | Facility: CLINIC | Age: 53
End: 2023-07-31
Payer: COMMERCIAL

## 2023-07-31 ENCOUNTER — MED ADMIN CHARGE (OUTPATIENT)
Age: 53
End: 2023-07-31

## 2023-07-31 VITALS
SYSTOLIC BLOOD PRESSURE: 122 MMHG | RESPIRATION RATE: 15 BRPM | OXYGEN SATURATION: 98 % | TEMPERATURE: 97.8 F | BODY MASS INDEX: 22.13 KG/M2 | DIASTOLIC BLOOD PRESSURE: 82 MMHG | HEIGHT: 75 IN | WEIGHT: 178 LBS | HEART RATE: 84 BPM

## 2023-07-31 PROCEDURE — 72131 CT LUMBAR SPINE W/O DYE: CPT

## 2023-07-31 PROCEDURE — 99214 OFFICE O/P EST MOD 30 MIN: CPT

## 2023-07-31 RX ORDER — CABOTEGRAVIR AND RILPIVIRINE 600-900/3
600 & 900 KIT INTRAMUSCULAR
Qty: 0 | Refills: 0 | Status: COMPLETED | OUTPATIENT
Start: 2023-07-31

## 2023-07-31 RX ADMIN — CABOTEGRAVIR AND RILPIVIRINE 0 MG/3ML: KIT at 00:00

## 2023-07-31 NOTE — PLAN
[FreeTextEntry1] : HIV - currently on Cabenuva, administered in office, RPV to L gluteal and CAB to R gluteal. Tolerated injection well, no c/o - VL <30 on 6/5/23; CD4-316  Back Pain - f/u with Dr Carlos - on PT, methocarbamol PRN  HCM - RTC in 2 mos for next Cabenuva injection - f/u colonoscopy results, reports had colonoscopy done recently

## 2023-07-31 NOTE — REVIEW OF SYSTEMS
[FreeTextEntry2] : Constitutional: No fever, changes in weight, chills, malaise ENT/Mouth: No hearing or vision problems, no sore throat, no difficulty swallowing, no headache Cardiovascular: No chest pain or palpitations Respiratory: No cough, SOB, sputum Gastrointestinal: No nausea/vomiting, no diarrhea/constipation, no abdominal pain, BMs normal Genitourinary: No dysuria, flow issues, or discoloration of urine Musculoskeletal: No joint pain or join swelling, no muscle pain Skin: No rashes or ulcers Neuro: No weakness or loss of sensation Psych: No anxiety or depressive symptoms

## 2023-07-31 NOTE — HISTORY OF PRESENT ILLNESS
[FreeTextEntry1] : F/u for ASCENCIO  [de-identified] : 52M presents for follow up and Cabenuva injection.  PMH of HIV and back pain.  No c/o at this time. Denies fever/chills, no recent wt change, no cough/sob/sore throat, no chest pain/dizziness, no n/v/d, no extremity edema, no weakness, denies pain. Declined STI screening at this time Tobacco- denies ETOH- occasional Drugs- occasional

## 2023-08-01 ENCOUNTER — NON-APPOINTMENT (OUTPATIENT)
Age: 53
End: 2023-08-01

## 2023-08-23 ENCOUNTER — TRANSCRIPTION ENCOUNTER (OUTPATIENT)
Age: 53
End: 2023-08-23

## 2023-08-25 ENCOUNTER — APPOINTMENT (OUTPATIENT)
Dept: PHYSICAL MEDICINE AND REHAB | Facility: CLINIC | Age: 53
End: 2023-08-25
Payer: COMMERCIAL

## 2023-08-25 VITALS
DIASTOLIC BLOOD PRESSURE: 80 MMHG | SYSTOLIC BLOOD PRESSURE: 128 MMHG | OXYGEN SATURATION: 98 % | HEART RATE: 72 BPM | WEIGHT: 178 LBS | BODY MASS INDEX: 22.13 KG/M2 | HEIGHT: 75 IN | TEMPERATURE: 97.8 F

## 2023-08-25 PROCEDURE — 99214 OFFICE O/P EST MOD 30 MIN: CPT

## 2023-08-25 NOTE — HISTORY OF PRESENT ILLNESS
[FreeTextEntry1] : Narendra Carlos M.D. Sports Medicine and Interventional Spine Department of Physical Medicine and Rehabilitation  [facility] [address], [address]th [place] [address]  [facility] [place] [facility] [address] [place] [address]  [facility] at  [facility] [address], [address]th [place] [address]  [facility] [place] Appointments Phone: [phone] Fax: [phone]  ----------------------------------------------------------------------------------------------------------------------------------------  PATIENT: TYLER MARADIAGA  MRN: [id number]  YOB: 1970  DATE OF SERVICE: 8/25/2023  Aug 25, 2023  Dear DrsSvetlana  Thank you for referring TYLER MARADIAGA to my Sports and Interventional Spine practice and office. Enclosed is a copy of the patient's consultation/progress note, which includes my complete assessment and recent studies completed during the patient's evaluation.  If you have questions or have any patients who require nonsurgical, non-opiate management of any sports, spine, or musculoskeletal conditions, please do not hesitate to contact my , Ade Haskins at [phone].  I look forward to taking care of your patients along with you.  Sincerely,  Narendra Carlos MD Sports, Interventional Spine, & Regenerative Musculoskeletal Medicine [facility] [place] [facility]                                                    Follow up Visit CC: low back pain   HPI:  This is a follow up visit to [facility] [place] [facility] Sports Medicine and Interventional Spine Practice.    TYLER MARADIAGA presents with the chief complaint as above.      Meds: denies regular PO pain medications Therapy Program: no recent structured targeted therapy program HEP: doing HEP regularly  Assoc Sx: Denies numbness, Tingling Denies Focal motor weakness in the upper or lower limbs Denies New or worsened bowel or bladder incontinence Denies Saddle anesthesia Denies Using Orthotic(s)/Supportive devices Denies Swelling in the upper/lower extremities They also deny frequent tripping, falling  ROS: A 14 point review of systems was completed. Positive findings are pain as described above. The remaining systems negative.  Prostate Hx: up to date COVID HX: reviewed  Interval Hx on Jun 23, 2023: presents for follow up. Since last visit, they underwent further diagnostic workup including additional imaging studies. Patient informed of all relevant imaging findings, all questions were answered including: Multilevel lumbar spondylitic changes as above notable for mild to moderate bilateral foraminal stenosis at L3-L4, L4-5 L5-S1, involving exiting L3, L4, L5 nerve roots, respectively and mild to moderate bilateral lateral recess stenosis at L3-L4 involving descending L4 nerve roots. No significant canal stenosis of the lumbar spine. There have been no significant changes to their aggravating or alleviating factors since the last visit.  Since the last visit, they relate improvements in pain previously associated with known exacerbating, aggravating factors and situations. Pharmacologic treatments now include OTC analgesics PRN, otherwise pharmacologic treatments are minimal. Denies new or worsened numbness, tingling, or focal motor deficit. Denies interval fall, accident, or injury. Denies change in bowel or bladder functioning.  Initial Hx on 04/21/2023: Presents to [facility]. patient was helping perform yard work one particular occasion 9/15/2022 when the pain started. The patient's difficulties began 9/15/2022. patient noticed the pain immediately, started relative rest. patient has been unable to find relief with chiropractic treatments, acupuncturists as well. patient has tried e-stim with zero lasting relief. The pain is graded as 7-8/10. The pain is described as "strained muscle pain and deep misaligned lower spine". right low back believes they are 1-2 "pops" away from relief The pain is intermittent. . The pain does not radiate. markedly limited with forward flexion, feels that they can only perform thoracolumbar forward flexion when counterbalanced. The patient feels that the pain is overall persistent. Patient denies other recent fall, MVA, injury, trauma, or accident besides presenting history above. Aggravating: everyday activity, rotation while carrying load, sit to stand transitional movements. Alleviating: rest, activity modification, avoiding compound lifts, pharmacologic treatments  Assoc Hx: patient is currently able to exercise despite their pain, ailment Ambulates without assistive device Injection Hx: denies locally directed treatment to the area in question Imaging Hx: reviewed  Level of functioning: indep with ambulation, indep with ADLs Living Situation: dwelling with steps to enter

## 2023-08-25 NOTE — ASSESSMENT
[FreeTextEntry1] :                                                       Assessment/Plan:  TYLER MARADIAGA is a 53 year male with chronic back pain here for follow up  Sacroiliac joint dysfunction, right Tendinopathy of the gluteus medius, B/L Hamstring tightness, B/L Chronic lumbar radiculopathy, L5, B/L Intervertebral disc disorder, Lumbar Lumbosacral spondylosis without spondylosis  Interval Hx on Aug 25, 2023: presents for follow up. Since last visit, ***.  There have been no significant changes to their aggravating or alleviating factors since the last visit.  Since the last visit, they relate improvements in pain previously associated with known exacerbating, aggravating factors and situations.  Pharmacologic treatments now include OTC analgesics PRN, but otherwise pharmacologic treatments are minimal. Given dearth of symptoms, pharmacologic treatments are now minimal.  Denies new or worsened numbness, tingling, or focal motor deficit. Denies interval fall, accident, or injury. Denies change in bowel or bladder functioning.  - Tiers of treatment and management of above diagnosis(es) were discussed with patient - Optimal diet, weight, sleep, and lifestyle management to minimize stress and maximize well being counseling provided - Imaging reviewed and discussed with patient - Reviewed previous encounter notes from 4/7/2023 Dr. HEIDI Beltran (Internal Medicine) - Patient was advised to resume a structured, targeted therapy program 2-3x/wk for 8 wks with goal toward HEP - Patient was educated on an appropriate home exercise program, provided with exercise recommendations, all questions answered - Patient was prescribed methocarbamol 750mg 1-2 tabs up to TID PRN muscle spasm, informed of sedative potential, advised to avoid driving, taking the subway, or operating heavy machinery, patient displayed a clear understanding of the plan including medication, its purpose and side effects, all questions answered - Patient was advised to apply cool compresses or warm heat to affected regions PRN - Patient may trial topical compound cream to the midline low back no more than twice per day as needed for next 2-3 weeks, Rx entered via portal, written information provided to the patient in addition to verbal explanation regarding the medication - Radiographs of low back, hips, pelvis reviewed from 4/2023 Educated about red flag symptoms including (but not limited to) new, worsened, or persistent: fever greater than 100F, bowel or bladder incontinence, bowel obstipation, inability to void urine, urinary leakage, Severe nausea or vomiting, Worsening numbness, worsening tingling/paresthesias, and/or new or progressive motor weakness; advised to seek immediate medical attention at his nearest Emergency department should they experience any of the above  - Follow up in 2-3 weeks after imaging  I have personally spent a total of at least 45 minutes preparing, reviewing internal and external records, explaining, counseling, and coordinating care for this patient encounter.  Thank you, Dr(s), for allowing me to participate in the care of your patient. Please do not hesitate to contact me with questions/concerns.  Narendra Carlos M.D. Sports and Interventional Spine Department of Physical Medicine and Rehabilitation  [facility] [place]  Email: [email or web address] [email or web address] [facility] [address], [address]th [place] [address]  Appointments: [phone] Fax: [phone]

## 2023-08-25 NOTE — REVIEW OF SYSTEMS
Review of Systems/Medical History  Patient summary reviewed  Chart reviewed  No history of anesthetic complications     Cardiovascular  Exercise tolerance (METS): >4,     Pulmonary  Smoker cigarette smoker  , Tobacco cessation counseling given ,        GI/Hepatic            Endo/Other    Obesity    GYN       Hematology   Musculoskeletal       Neurology   Psychology           Physical Exam    Airway    Mallampati score: III  TM Distance: >3 FB  Neck ROM: full     Dental   upper dentures,     Cardiovascular      Pulmonary      Other Findings        Anesthesia Plan  ASA Score- 2     Anesthesia Type- IV sedation with anesthesia with ASA Monitors  Additional Monitors:   Airway Plan:         Plan Factors- Patient instructed to abstain from smoking on day of procedure  Patient smoked on day of surgery  Induction- intravenous  Postoperative Plan- Plan for postoperative opioid use  Informed Consent- Anesthetic plan and risks discussed with patient  I personally reviewed this patient with the CRNA  Discussed and agreed on the Anesthesia Plan with the CRNA  Titi Baker [Patient Intake Form Reviewed] : Patient intake form was reviewed

## 2023-09-07 ENCOUNTER — TRANSCRIPTION ENCOUNTER (OUTPATIENT)
Age: 53
End: 2023-09-07

## 2023-09-12 ENCOUNTER — TRANSCRIPTION ENCOUNTER (OUTPATIENT)
Age: 53
End: 2023-09-12

## 2023-09-22 ENCOUNTER — APPOINTMENT (OUTPATIENT)
Dept: INFECTIOUS DISEASE | Facility: CLINIC | Age: 53
End: 2023-09-22

## 2023-09-27 ENCOUNTER — APPOINTMENT (OUTPATIENT)
Dept: INFECTIOUS DISEASE | Facility: CLINIC | Age: 53
End: 2023-09-27
Payer: COMMERCIAL

## 2023-09-27 ENCOUNTER — NON-APPOINTMENT (OUTPATIENT)
Age: 53
End: 2023-09-27

## 2023-09-27 ENCOUNTER — OUTPATIENT (OUTPATIENT)
Dept: OUTPATIENT SERVICES | Facility: HOSPITAL | Age: 53
LOS: 1 days | End: 2023-09-27

## 2023-09-27 ENCOUNTER — TRANSCRIPTION ENCOUNTER (OUTPATIENT)
Age: 53
End: 2023-09-27

## 2023-09-27 VITALS
TEMPERATURE: 97.1 F | SYSTOLIC BLOOD PRESSURE: 106 MMHG | HEART RATE: 97 BPM | RESPIRATION RATE: 12 BRPM | DIASTOLIC BLOOD PRESSURE: 67 MMHG | OXYGEN SATURATION: 98 % | HEIGHT: 75 IN | WEIGHT: 175 LBS | BODY MASS INDEX: 21.76 KG/M2

## 2023-09-27 DIAGNOSIS — M54.50 LOW BACK PAIN, UNSPECIFIED: ICD-10-CM

## 2023-09-27 PROCEDURE — 99214 OFFICE O/P EST MOD 30 MIN: CPT | Mod: 25

## 2023-09-27 RX ORDER — CABOTEGRAVIR AND RILPIVIRINE 600-900/3
600 & 900 KIT INTRAMUSCULAR
Qty: 0 | Refills: 0 | Status: COMPLETED | OUTPATIENT
Start: 2023-09-27

## 2023-09-27 RX ADMIN — CABOTEGRAVIR AND RILPIVIRINE 0 MG/3ML: KIT at 00:00

## 2023-09-29 LAB
CD3 CELLS # BLD: 1046 CELLS/UL
CD3 CELLS NFR BLD: 68 %
CD3+CD4+ CELLS # BLD: 243 CELLS/UL
CD3+CD4+ CELLS NFR BLD: 16 %
CD3+CD4+ CELLS/CD3+CD8+ CLL SPEC: 0.3 RATIO
CD3+CD8+ CELLS # SPEC: 804 CELLS/UL
CD3+CD8+ CELLS NFR BLD: 53 %
CHOLEST SERPL-MCNC: 257 MG/DL
HDLC SERPL-MCNC: 46 MG/DL
HIV1 RNA # SERPL NAA+PROBE: NORMAL
HIV1 RNA # SERPL NAA+PROBE: NORMAL COPIES/ML
LDLC SERPL CALC-MCNC: 167 MG/DL
NONHDLC SERPL-MCNC: 211 MG/DL
TRIGL SERPL-MCNC: 235 MG/DL
VIRAL LOAD INTERP: NORMAL
VIRAL LOAD LOG: NORMAL LG COP/ML

## 2023-10-27 ENCOUNTER — APPOINTMENT (OUTPATIENT)
Dept: PHYSICAL MEDICINE AND REHAB | Facility: CLINIC | Age: 53
End: 2023-10-27
Payer: COMMERCIAL

## 2023-10-27 VITALS
DIASTOLIC BLOOD PRESSURE: 83 MMHG | OXYGEN SATURATION: 98 % | TEMPERATURE: 98 F | BODY MASS INDEX: 21.76 KG/M2 | SYSTOLIC BLOOD PRESSURE: 115 MMHG | HEIGHT: 75 IN | HEART RATE: 90 BPM | WEIGHT: 175 LBS

## 2023-10-27 PROCEDURE — 99214 OFFICE O/P EST MOD 30 MIN: CPT

## 2023-11-08 ENCOUNTER — NON-APPOINTMENT (OUTPATIENT)
Age: 53
End: 2023-11-08

## 2023-11-08 ENCOUNTER — TRANSCRIPTION ENCOUNTER (OUTPATIENT)
Age: 53
End: 2023-11-08

## 2023-11-08 DIAGNOSIS — U07.1 COVID-19: ICD-10-CM

## 2023-11-08 RX ORDER — METHYLPREDNISOLONE 4 MG/1
4 TABLET ORAL
Qty: 1 | Refills: 0 | Status: COMPLETED | COMMUNITY
Start: 2023-04-21 | End: 2023-11-08

## 2023-11-08 RX ORDER — BLOOD PRESSURE TEST KIT-MEDIUM
KIT MISCELLANEOUS
Qty: 1 | Refills: 0 | Status: COMPLETED | COMMUNITY
Start: 2022-11-22 | End: 2023-11-08

## 2023-11-08 RX ORDER — METHOCARBAMOL 750 MG/1
750 TABLET, FILM COATED ORAL
Qty: 60 | Refills: 1 | Status: COMPLETED | COMMUNITY
Start: 2023-04-21 | End: 2023-11-08

## 2023-11-08 RX ORDER — NIRMATRELVIR AND RITONAVIR 300-100 MG
20 X 150 MG & KIT ORAL
Qty: 1 | Refills: 0 | Status: COMPLETED | COMMUNITY
Start: 2023-11-08 | End: 2023-11-13

## 2023-11-09 ENCOUNTER — TRANSCRIPTION ENCOUNTER (OUTPATIENT)
Age: 53
End: 2023-11-09

## 2023-11-13 ENCOUNTER — TRANSCRIPTION ENCOUNTER (OUTPATIENT)
Age: 53
End: 2023-11-13

## 2023-11-22 ENCOUNTER — OUTPATIENT (OUTPATIENT)
Dept: OUTPATIENT SERVICES | Facility: HOSPITAL | Age: 53
LOS: 1 days | End: 2023-11-22

## 2023-11-22 ENCOUNTER — APPOINTMENT (OUTPATIENT)
Dept: INFECTIOUS DISEASE | Facility: CLINIC | Age: 53
End: 2023-11-22
Payer: COMMERCIAL

## 2023-11-22 ENCOUNTER — MED ADMIN CHARGE (OUTPATIENT)
Age: 53
End: 2023-11-22

## 2023-11-22 PROCEDURE — XXXXX: CPT

## 2023-11-22 RX ORDER — CABOTEGRAVIR AND RILPIVIRINE 600-900/3
600 & 900 KIT INTRAMUSCULAR
Qty: 0 | Refills: 0 | Status: COMPLETED | OUTPATIENT
Start: 2023-11-22

## 2023-11-22 RX ADMIN — CABOTEGRAVIR AND RILPIVIRINE 0 MG/3ML: KIT at 00:00

## 2023-11-25 ENCOUNTER — NON-APPOINTMENT (OUTPATIENT)
Age: 53
End: 2023-11-25

## 2023-11-27 DIAGNOSIS — B20 HUMAN IMMUNODEFICIENCY VIRUS [HIV] DISEASE: ICD-10-CM

## 2024-01-09 ENCOUNTER — TRANSCRIPTION ENCOUNTER (OUTPATIENT)
Age: 54
End: 2024-01-09

## 2024-01-19 ENCOUNTER — APPOINTMENT (OUTPATIENT)
Dept: INFECTIOUS DISEASE | Facility: CLINIC | Age: 54
End: 2024-01-19
Payer: COMMERCIAL

## 2024-01-19 ENCOUNTER — APPOINTMENT (OUTPATIENT)
Dept: INFECTIOUS DISEASE | Facility: CLINIC | Age: 54
End: 2024-01-19

## 2024-01-19 ENCOUNTER — OUTPATIENT (OUTPATIENT)
Dept: OUTPATIENT SERVICES | Facility: HOSPITAL | Age: 54
LOS: 1 days | End: 2024-01-19

## 2024-01-19 PROCEDURE — ZZZZZ: CPT

## 2024-01-19 RX ORDER — CABOTEGRAVIR AND RILPIVIRINE 600-900/3
600 & 900 KIT INTRAMUSCULAR
Qty: 0 | Refills: 0 | Status: COMPLETED | OUTPATIENT
Start: 2024-01-18

## 2024-01-22 ENCOUNTER — TRANSCRIPTION ENCOUNTER (OUTPATIENT)
Age: 54
End: 2024-01-22

## 2024-03-05 ENCOUNTER — TRANSCRIPTION ENCOUNTER (OUTPATIENT)
Age: 54
End: 2024-03-05

## 2024-03-05 ENCOUNTER — NON-APPOINTMENT (OUTPATIENT)
Age: 54
End: 2024-03-05

## 2024-03-05 RX ORDER — VALACYCLOVIR 500 MG/1
500 TABLET, FILM COATED ORAL
Qty: 30 | Refills: 3 | Status: ACTIVE | COMMUNITY
Start: 2022-07-20 | End: 1900-01-01

## 2024-03-14 DIAGNOSIS — E78.5 HYPERLIPIDEMIA, UNSPECIFIED: ICD-10-CM

## 2024-03-14 DIAGNOSIS — Z78.9 OTHER SPECIFIED HEALTH STATUS: ICD-10-CM

## 2024-03-15 ENCOUNTER — APPOINTMENT (OUTPATIENT)
Dept: INFECTIOUS DISEASE | Facility: CLINIC | Age: 54
End: 2024-03-15
Payer: COMMERCIAL

## 2024-03-15 ENCOUNTER — APPOINTMENT (OUTPATIENT)
Dept: INFECTIOUS DISEASE | Facility: CLINIC | Age: 54
End: 2024-03-15

## 2024-03-15 ENCOUNTER — APPOINTMENT (OUTPATIENT)
Dept: PHYSICAL MEDICINE AND REHAB | Facility: CLINIC | Age: 54
End: 2024-03-15
Payer: COMMERCIAL

## 2024-03-15 ENCOUNTER — OUTPATIENT (OUTPATIENT)
Dept: OUTPATIENT SERVICES | Facility: HOSPITAL | Age: 54
LOS: 1 days | End: 2024-03-15

## 2024-03-15 ENCOUNTER — LABORATORY RESULT (OUTPATIENT)
Age: 54
End: 2024-03-15

## 2024-03-15 VITALS
RESPIRATION RATE: 12 BRPM | HEIGHT: 75 IN | TEMPERATURE: 98.3 F | SYSTOLIC BLOOD PRESSURE: 122 MMHG | OXYGEN SATURATION: 95 % | HEART RATE: 105 BPM | BODY MASS INDEX: 21.51 KG/M2 | DIASTOLIC BLOOD PRESSURE: 87 MMHG | WEIGHT: 173 LBS

## 2024-03-15 VITALS
HEART RATE: 108 BPM | WEIGHT: 173 LBS | HEIGHT: 75 IN | SYSTOLIC BLOOD PRESSURE: 123 MMHG | DIASTOLIC BLOOD PRESSURE: 80 MMHG | BODY MASS INDEX: 21.51 KG/M2 | TEMPERATURE: 98.3 F | OXYGEN SATURATION: 96 %

## 2024-03-15 DIAGNOSIS — B20 HUMAN IMMUNODEFICIENCY VIRUS [HIV] DISEASE: ICD-10-CM

## 2024-03-15 DIAGNOSIS — M62.89 OTHER SPECIFIED DISORDERS OF MUSCLE: ICD-10-CM

## 2024-03-15 DIAGNOSIS — M67.959 UNSPECIFIED DISORDER OF SYNOVIUM AND TENDON, UNSPECIFIED THIGH: ICD-10-CM

## 2024-03-15 DIAGNOSIS — M51.9 UNSPECIFIED THORACIC, THORACOLUMBAR AND LUMBOSACRAL INTERVERTEBRAL DISC DISORDER: ICD-10-CM

## 2024-03-15 DIAGNOSIS — M53.3 SACROCOCCYGEAL DISORDERS, NOT ELSEWHERE CLASSIFIED: ICD-10-CM

## 2024-03-15 DIAGNOSIS — M54.16 RADICULOPATHY, LUMBAR REGION: ICD-10-CM

## 2024-03-15 DIAGNOSIS — C44.91 BASAL CELL CARCINOMA OF SKIN, UNSPECIFIED: ICD-10-CM

## 2024-03-15 DIAGNOSIS — M47.27 OTHER SPONDYLOSIS WITH RADICULOPATHY, LUMBOSACRAL REGION: ICD-10-CM

## 2024-03-15 DIAGNOSIS — M24.9 JOINT DERANGEMENT, UNSPECIFIED: ICD-10-CM

## 2024-03-15 PROCEDURE — 99215 OFFICE O/P EST HI 40 MIN: CPT

## 2024-03-15 PROCEDURE — G2211 COMPLEX E/M VISIT ADD ON: CPT

## 2024-03-15 PROCEDURE — 99204 OFFICE O/P NEW MOD 45 MIN: CPT | Mod: 1L,25

## 2024-03-15 RX ORDER — METHOCARBAMOL 750 MG/1
750 TABLET, FILM COATED ORAL
Qty: 30 | Refills: 1 | Status: ACTIVE | COMMUNITY
Start: 2024-03-15 | End: 1900-01-01

## 2024-03-15 RX ORDER — CABOTEGRAVIR AND RILPIVIRINE 600-900/3
600 & 900 KIT INTRAMUSCULAR
Qty: 1 | Refills: 5 | Status: ACTIVE | COMMUNITY
Start: 2023-03-03 | End: 1900-01-01

## 2024-03-15 NOTE — PHYSICAL EXAM
[General Appearance - Alert] : alert [General Appearance - In No Acute Distress] : in no acute distress [Sclera] : the sclera and conjunctiva were normal [PERRL With Normal Accommodation] : pupils were equal in size, round, reactive to light [Extraocular Movements] : extraocular movements were intact [Outer Ear] : the ears and nose were normal in appearance [Oropharynx] : the oropharynx was normal with no thrush [Neck Appearance] : the appearance of the neck was normal [Neck Cervical Mass (___cm)] : no neck mass was observed [Jugular Venous Distention Increased] : there was no jugular-venous distention [Thyroid Diffuse Enlargement] : the thyroid was not enlarged [Auscultation Breath Sounds / Voice Sounds] : lungs were clear to auscultation bilaterally [Heart Rate And Rhythm] : heart rate was normal and rhythm regular [Heart Sounds] : normal S1 and S2 [Heart Sounds Gallop] : no gallops [Murmurs] : no murmurs [Heart Sounds Pericardial Friction Rub] : no pericardial rub [Full Pulse] : the pedal pulses are present [Edema] : there was no peripheral edema [Bowel Sounds] : normal bowel sounds [Abdomen Soft] : soft [Abdomen Tenderness] : non-tender [Abdomen Mass (___ Cm)] : no abdominal mass palpated [Costovertebral Angle Tenderness] : no CVA tenderness [No Palpable Adenopathy] : no palpable adenopathy [Nail Clubbing] : no clubbing  or cyanosis of the fingernails [Musculoskeletal - Swelling] : no joint swelling [Motor Tone] : muscle strength and tone were normal [] : no rash [Skin Color & Pigmentation] : normal skin color and pigmentation [Deep Tendon Reflexes (DTR)] : deep tendon reflexes were 2+ and symmetric [Sensation] : the sensory exam was normal to light touch and pinprick [No Focal Deficits] : no focal deficits [Oriented To Time, Place, And Person] : oriented to person, place, and time [Affect] : the affect was normal

## 2024-03-15 NOTE — HISTORY OF PRESENT ILLNESS
[FreeTextEntry1] : 53 year old M with HIV on Cabenuva Lives in [place], [place] came today for Cabenuva injection. Last F/U visit Sept 2023. Had COVID November 2023. Sees FU treatment in Spring for BCC- S/P Moh's x 4  Has been well. Sees osteopath for Therapy for Low back pain Starting Yoga [Sexually Active] : The patient is not sexually active

## 2024-03-15 NOTE — ASSESSMENT
[FreeTextEntry1] : 53M with HIV controlled on Cabenuva Continue current meds, injection today. Diagnosed 1997, had participated in clinical trials. Prior ARV Atripla, Biktarvy (weight gain) and Juluca VL suppressed, CD4 mid-300s, ratio 0.3 H/O HSV on Valtrex. had oral herpes with stress or sunburn. Denies IDU ? past STI history Estimated 10-year ASCVD risk score = 5.7% (as non-smoker), non-HDL chol 211, based on REPRIEVE results would recommend initiation of a statin, rosuvastatin 10 mg po daily, monitor for AE and tolerability. Has modified diet, cut out cheese, ate more oats, and had a significant improvement.  Colonoscopy 2021- F/U 10 years HCV- treated with SVR in 2004, was told of fibrosis- check Fibrosure H/O Basal Cell CA, gets FU in Spring and liquid nitrogen in winter. Had gone to derm at [facility], now needs dermatologist. Will [facility]- needs referral. Back pain - in exercise program Vaccines: S/P Prevnar 20, Hep A and B immune. Mpox x 2. Recommend Shingrix.X 1 Check HIV VL, CD4, SMA, CBC, Lipids, Syphilis screen, STI screen, Fibrosure, Genosure archive, U/A, PSA, Vitamin D, QTF, HgA1c Dental: insurance changed- 2 years without care, brushes and flosses twice daily.

## 2024-03-16 LAB
25(OH)D3 SERPL-MCNC: 30.1 NG/ML
ALBUMIN SERPL ELPH-MCNC: 5.4 G/DL
ALP BLD-CCNC: 50 U/L
ALT SERPL-CCNC: 30 U/L
ANION GAP SERPL CALC-SCNC: 14 MMOL/L
APPEARANCE: CLEAR
AST SERPL-CCNC: 29 U/L
BACTERIA: NEGATIVE /HPF
BILIRUB SERPL-MCNC: 0.5 MG/DL
BILIRUBIN URINE: NEGATIVE
BLOOD URINE: NEGATIVE
BUN SERPL-MCNC: 21 MG/DL
CALCIUM SERPL-MCNC: 10.1 MG/DL
CAST: 0 /LPF
CD3 CELLS # BLD: 1591 CELLS/UL
CD3 CELLS NFR BLD: 68 %
CD3+CD4+ CELLS # BLD: 371 CELLS/UL
CD3+CD4+ CELLS NFR BLD: 16 %
CD3+CD4+ CELLS/CD3+CD8+ CLL SPEC: 0.3 RATIO
CD3+CD8+ CELLS # SPEC: 1225 CELLS/UL
CD3+CD8+ CELLS NFR BLD: 53 %
CHLORIDE SERPL-SCNC: 100 MMOL/L
CHOLEST SERPL-MCNC: 290 MG/DL
CO2 SERPL-SCNC: 24 MMOL/L
COLOR: YELLOW
CREAT SERPL-MCNC: 1.15 MG/DL
EGFR: 76 ML/MIN/1.73M2
EPITHELIAL CELLS: 0 /HPF
ESTIMATED AVERAGE GLUCOSE: 111 MG/DL
GLUCOSE QUALITATIVE U: NEGATIVE MG/DL
GLUCOSE SERPL-MCNC: 102 MG/DL
HBA1C MFR BLD HPLC: 5.5 %
HBV CORE IGG+IGM SER QL: NONREACTIVE
HCT VFR BLD CALC: 43.4 %
HDLC SERPL-MCNC: 48 MG/DL
HGB BLD-MCNC: 14.1 G/DL
KETONES URINE: NEGATIVE MG/DL
LDLC SERPL CALC-MCNC: 200 MG/DL
LEUKOCYTE ESTERASE URINE: NEGATIVE
MCHC RBC-ENTMCNC: 29.4 PG
MCHC RBC-ENTMCNC: 32.5 GM/DL
MCV RBC AUTO: 90.6 FL
MICROSCOPIC-UA: NORMAL
NITRITE URINE: NEGATIVE
NONHDLC SERPL-MCNC: 242 MG/DL
PH URINE: 7
PLATELET # BLD AUTO: 240 K/UL
POTASSIUM SERPL-SCNC: 4.9 MMOL/L
PROT SERPL-MCNC: 7.9 G/DL
PROTEIN URINE: NEGATIVE MG/DL
PSA SERPL-MCNC: 28.2 NG/ML
RBC # BLD: 4.79 M/UL
RBC # FLD: 13.2 %
RED BLOOD CELLS URINE: 1 /HPF
SODIUM SERPL-SCNC: 139 MMOL/L
SPECIFIC GRAVITY URINE: 1.02
T PALLIDUM AB SER QL IA: NEGATIVE
TRIGL SERPL-MCNC: 218 MG/DL
UROBILINOGEN URINE: 1 MG/DL
WBC # FLD AUTO: 4.82 K/UL
WHITE BLOOD CELLS URINE: 0 /HPF

## 2024-03-18 ENCOUNTER — NON-APPOINTMENT (OUTPATIENT)
Age: 54
End: 2024-03-18

## 2024-03-18 LAB
C TRACH RRNA SPEC QL NAA+PROBE: NOT DETECTED
N GONORRHOEA RRNA SPEC QL NAA+PROBE: NOT DETECTED
SOURCE AMPLIFICATION: NORMAL

## 2024-03-19 DIAGNOSIS — B20 HUMAN IMMUNODEFICIENCY VIRUS [HIV] DISEASE: ICD-10-CM

## 2024-03-19 RX ORDER — CABOTEGRAVIR AND RILPIVIRINE 600-900/3
600 & 900 KIT INTRAMUSCULAR
Qty: 0 | Refills: 0 | Status: COMPLETED | OUTPATIENT
Start: 2024-03-15

## 2024-03-20 LAB
FIBROSIS SCORE: 0.18
FIBROSIS STAGE: NORMAL
FIBROSURE ALPHA 2 MACROGLOBULINS: 150 MG/DL
FIBROSURE ALT (SGPT): 30 IU/L
FIBROSURE APOLIPOPROTEIN A1: 142 MG/DL
FIBROSURE COMMENT 2: NORMAL
FIBROSURE GGT: 13 IU/L
FIBROSURE HAPTOGLOBIN: 40 MG/DL
FIBROSURE INTERPRETATIONS: NORMAL
FIBROSURE LIMITATIONS: NORMAL
FIBROSURE NECROINFLAMM ACTIVITY SCORING: NORMAL
FIBROSURE NECROINFLAMMAT ACTIVITY GRPFIBROSURE NECROINFLAMMA: NORMAL
FIBROSURE NECROINFLAMMAT ACTIVITY SCORE: 0.13
FIBROSURE SCORING: NORMAL
FIBROSURE TOTAL BILIRUBIN: 0.4 MG/DL
M TB IFN-G BLD-IMP: NEGATIVE
QUANTIFERON TB PLUS MITOGEN MINUS NIL: >10 IU/ML
QUANTIFERON TB PLUS NIL: 0.66 IU/ML
QUANTIFERON TB PLUS TB1 MINUS NIL: 0 IU/ML
QUANTIFERON TB PLUS TB2 MINUS NIL: 0 IU/ML

## 2024-03-25 ENCOUNTER — NON-APPOINTMENT (OUTPATIENT)
Age: 54
End: 2024-03-25

## 2024-04-08 ENCOUNTER — APPOINTMENT (OUTPATIENT)
Dept: DERMATOLOGY | Facility: CLINIC | Age: 54
End: 2024-04-08
Payer: COMMERCIAL

## 2024-04-08 DIAGNOSIS — Z12.83 ENCOUNTER FOR SCREENING FOR MALIGNANT NEOPLASM OF SKIN: ICD-10-CM

## 2024-04-08 DIAGNOSIS — L57.0 ACTINIC KERATOSIS: ICD-10-CM

## 2024-04-08 DIAGNOSIS — L82.1 OTHER SEBORRHEIC KERATOSIS: ICD-10-CM

## 2024-04-08 DIAGNOSIS — D22.9 MELANOCYTIC NEVI, UNSPECIFIED: ICD-10-CM

## 2024-04-08 PROCEDURE — 17000 DESTRUCT PREMALG LESION: CPT

## 2024-04-08 PROCEDURE — 17003 DESTRUCT PREMALG LES 2-14: CPT

## 2024-04-08 PROCEDURE — 99203 OFFICE O/P NEW LOW 30 MIN: CPT | Mod: 25

## 2024-04-13 PROBLEM — M54.16 LUMBAR RADICULITIS: Status: ACTIVE | Noted: 2023-05-11

## 2024-04-13 PROBLEM — M62.89 HAMSTRING TIGHTNESS: Status: ACTIVE | Noted: 2023-04-21

## 2024-04-13 PROBLEM — M53.3 SACROILIAC JOINT DYSFUNCTION: Status: ACTIVE | Noted: 2023-04-21

## 2024-04-13 PROBLEM — L82.1 SEBORRHEIC KERATOSES: Status: ACTIVE | Noted: 2024-04-13

## 2024-04-13 PROBLEM — M24.9: Status: ACTIVE | Noted: 2023-04-21

## 2024-04-13 PROBLEM — L57.0 ACTINIC KERATOSES: Status: ACTIVE | Noted: 2024-04-13

## 2024-04-13 PROBLEM — Z12.83 SCREENING EXAM FOR SKIN CANCER: Status: ACTIVE | Noted: 2024-04-13

## 2024-04-13 PROBLEM — D22.9 MULTIPLE BENIGN MELANOCYTIC NEVI: Status: ACTIVE | Noted: 2024-04-13

## 2024-04-13 PROBLEM — M67.959 TENDINOPATHY OF GLUTEUS MEDIUS: Status: ACTIVE | Noted: 2023-04-21

## 2024-04-13 NOTE — HISTORY OF PRESENT ILLNESS
[FreeTextEntry1] : NP spots [de-identified] : NP  Requesting evaluation of moles and other spots on body Has few spots on chest he self-treated with Efudex (completed treatment about a week ago) as could tell they were similar to pre-cancers he has had in the past Has h/o multiple BCCs HIV pt on Cabenuva

## 2024-04-13 NOTE — ASSESSMENT
[FreeTextEntry1] : #AKs The risks/benefits/alternatives of cryo-destruction was explained to the patient which, include but are not limited to redness, swelling, pain, blistering, scar, discoloration of skin, and recurrence. The patient expressed understanding of these risks and agreed to the procedure. 9 lesions treated with 2 cycles of LN2 (lesions on chest treated with 1 round given recent completion of Efudex in this area). The procedure was well tolerated, without complication. We have discussed related skin care.  #SKs  - discussed benign nature; no therapy indicated at this time  # Multiple melanocytic nevi, all benign appearing on today's exam -Sun protection was discussed. The proper use of broad spectrum UVB/UVA sunscreen with SPF 30 or greater was reviewed and the need for re-application after swimming or sweating or 2-3 hours was emphasized. We discussed judicious use of clothing and avoidance of peak periods of sun exposure. I made the patient aware of need for year-round protection. ABCDEs of melanoma reviewed. -Self-skin check also reviewed -Counseled pt to monitor for changes   # Screening for skin cancer -ABCDEs of melanoma, sun safety, and self-skin check reviewed -Counseled pt to notify us of any changing or concerning lesions  RTC 6-8 weeks if AKs not resolved, otherwise annual FBSE

## 2024-04-13 NOTE — PHYSICAL EXAM
[FreeTextEntry3] : Pink gritty papules scalp, chest, arm Scattered well demarcated stuck on appearing brown papules and plaques on the trunk and extremities. Fairly uniform and regular brown macules and papules on the trunk and extremities

## 2024-04-13 NOTE — HISTORY OF PRESENT ILLNESS
[FreeTextEntry1] : Narendra Carlos M.D. Sports Medicine and Interventional Spine Department of Physical Medicine and Rehabilitation  [facility] Orth[facility] [address] [facility], 11th [place] [address]  [facility] [place] at [facility] [address] [place] [address]  [facility] at  [facility] [address], [address] [place] [address]  For [place] Appointments Phone: [phone] Fax: [phone]  ----------------------------------------------------------------------------------------------------------------------------------------  PATIENT: TYLER MARADIAGA  MRN: [id number]  YOB: 1970  DATE OF SERVICE: Mar 15, 2024   Mar 15, 2024   Dear DrsSvetlana  Thank you for referring TYLER MARADIAGA to my Sports and Interventional Spine practice and office. Enclosed is a copy of the patient's consultation/progress note, which includes my complete assessment and recent studies completed during the patient's evaluation.  If you have questions or have any patients who require nonsurgical, non-opiate management of any sports, spine, or musculoskeletal conditions, please do not hesitate to contact my , Ade Haskins at [phone].  I look forward to taking care of your patients along with you.  Sincerely,  Narendra Carlos MD Sports, Interventional Spine, & Regenerative Musculoskeletal Medicine Orth[facility] [place] at [facility]                                                    Follow up Visit CC: low back pain   HPI:  This is a follow up visit to [facility] [place] at [facility] Sports Medicine and Interventional Spine Practice.    TYLER AMRADIAGA presents with the chief complaint as above.    Interval Hx on Mar 15, 2024: presents for follow up. Since last visit, patient has experienced overall 45-50% relief of their axial low back pain. patient carefully describes PT treatments, workouts, and increased awareness of their body movements. patient has continued to receive virtual treatments with online therapists. patient describes their overall approach with elongating the spine and "creating space." patient has continue their yoga treatments, looping in their . patient has made numerous adjustments to their routine.  Since the last visit, they relate significant improvements in pain previously associated with known exacerbating, aggravating factors and situations. Pharmacologic treatments now include OTC analgesics PRN, but otherwise pharmacologic treatments are minimal. Denies new or worsened numbness, tingling, or focal motor deficit. Denies interval fall, accident, or injury. Denies change in bowel or bladder functioning. patient had been taking advil as there as a pharmacy issue and did not receive further methocarbamol since shortly after our last visit. patient has not been using the topical compound. patient having one week of right lower limb radicular discomfort, "shot up to the spot." patient careful with the knee for a couple of days then returned to their usual awareness.   Meds: denies regular PO pain medications Therapy Program: no recent structured targeted therapy program HEP: doing HEP regularly  Assoc Sx: Denies numbness, Tingling Denies Focal motor weakness in the upper or lower limbs Denies New or worsened bowel or bladder incontinence Denies Saddle anesthesia Denies Using Orthotic(s)/Supportive devices Denies Swelling in the upper/lower extremities They also deny frequent tripping, falling  ROS: A 14 point review of systems was completed. Positive findings are pain as described above. The remaining systems negative.  Prostate Hx: up to date COVID HX: reviewed  Interval Hx on Oct 27, 2023: presents for follow up. Since last visit, patient is overall doing "good" with exercise program and minimal prescription/OTC pharmacologic treatment. Patient has connected with a  local to them. They are having consistent treatments with their therapists. Patient has been able to return to their usual activity levels minus resistance training. Patient believes they don't have as many instances where they experience debilitating pain. Patient receives online training sessions. Has been using the oral muscle relaxant as needed. patient self-discontinued the topical compound cream for now. Patient has been using the combination ibuprofen/tylenol.  Since the last visit, they relate improvements in pain previously associated with known exacerbating, aggravating factors and situations. Pharmacologic treatments now include OTC analgesics PRN, but otherwise pharmacologic treatments are minimal. Denies new or worsened numbness, tingling, or focal motor deficit. Denies interval fall, accident, or injury. Denies change in bowel or bladder functioning.  Interval Hx on Aug 25, 2023: presents for follow up. Since last visit, their mostly axial low back pain continues, ovearll about the same. they continue to remainquite active despite their pain level. pain level can fluctuate at times with and without relation to physical exertion. patient has managed to find multiple promising exercise outlets in their region, they relate serious doubts as to the quality of therapy they are receiving in [facility]. Since the last visit, they relate improvements in pain previously associated with known exacerbating, aggravating factors and situations. Pharmacologic treatments now include OTC analgesics PRN, but otherwise pharmacologic treatments are minimal. Has been applying the topical compound cream most days of the week, 1-2 times since the last visit. Denies new or worsened numbness, tingling, or focal motor deficit. Denies interval fall, accident, or injury. Denies change in bowel or bladder functioning.  Interval Hx on Jun 23, 2023: presents for follow up. Since last visit, they underwent further diagnostic workup including additional imaging studies. Patient informed of all relevant imaging findings, all questions were answered including: Multilevel lumbar spondylitic changes as above notable for mild to moderate bilateral foraminal stenosis at L3-L4, L4-5 L5-S1, involving exiting L3, L4, L5 nerve roots, respectively and mild to moderate bilateral lateral recess stenosis at L3-L4 involving descending L4 nerve roots. No significant canal stenosis of the lumbar spine. There have been no significant changes to their aggravating or alleviating factors since the last visit.  Since the last visit, they relate improvements in pain previously associated with known exacerbating, aggravating factors and situations. Pharmacologic treatments now include OTC analgesics PRN, otherwise pharmacologic treatments are minimal. Denies new or worsened numbness, tingling, or focal motor deficit. Denies interval fall, accident, or injury. Denies change in bowel or bladder functioning.  Initial Hx on 04/21/2023: Presents to Josue Elizabeth. patient was helping perform yard work one particular occasion 9/15/2022 when the pain started. The patient's difficulties began 9/15/2022. patient noticed the pain immediately, started relative rest. patient has been unable to find relief with chiropractic treatments, acupuncturists as well. patient has tried e-stim with zero lasting relief. The pain is graded as 7-8/10. The pain is described as "strained muscle pain and deep misaligned lower spine". right low back believes they are 1-2 "pops" away from relief The pain is intermittent. . The pain does not radiate. markedly limited with forward flexion, feels that they can only perform thoracolumbar forward flexion when counterbalanced. The patient feels that the pain is overall persistent. Patient denies other recent fall, MVA, injury, trauma, or accident besides presenting history above. Aggravating: everyday activity, rotation while carrying load, sit to stand transitional movements. Alleviating: rest, activity modification, avoiding compound lifts, pharmacologic treatments  Assoc Hx: patient is currently able to exercise despite their pain, ailment Ambulates without assistive device Injection Hx: denies locally directed treatment to the area in question Imaging Hx: reviewed  Level of functioning: indep with ambulation, indep with ADLs Living Situation: dwelling with steps to enter

## 2024-04-13 NOTE — ASSESSMENT
[FreeTextEntry1] : Assessment/Plan:  TYLER MARADIAGA is a 53 year male with chronic back pain here for follow up  Lumbosacral spondylosis without myelopathy Intervertebral disc disorder, Lumbar Decreased range of motion of lumbar region, Right > Left B/L EHL weakness, L>R  Sacroiliac joint dysfunction, right Tendinopathy of the gluteus medius, B/L Hamstring tightness, B/L Chronic lumbar radiculopathy, L5, B/L  - Tiers of treatment and management of above diagnosis(es) were discussed with patient - Optimal diet, weight, sleep, and lifestyle management to minimize stress and maximize well being counseling provided - Imaging reviewed and discussed with patient - Reviewed previous encounter notes from 4/7/2023 Dr. HEIDI Beltran (Internal Medicine) - Patient was advised to CONTINUE their structured, targeted therapy program 2-3x/wk for 8 wks with goal toward HEP, provided with new referral 8/25/2023 - Patient was educated on an appropriate home exercise program, provided with exercise recommendations, all questions answered - 10/27/2023: Patient was prescribed refill of methocarbamol 750mg 1-2 tabs up to TID PRN muscle spasm, informed of sedative potential, advised to avoid driving, taking the subway, or operating heavy machinery, patient displayed a clear understanding of the plan including medication, its purpose and side effects, all questions answered - Patient was advised to apply cool compresses or warm heat to affected regions PRN - Patient may trial topical compound cream to the midline low back no more than twice per day as needed for next 2-3 weeks, Rx entered via portal, written information provided to the patient in addition to verbal explanation regarding the medication - Radiographs of low back, hips, pelvis reviewed from 4/2023 - Mar 15, 2024: referral to Anesthesia Pain placed given multilevel lumbosacral spondylosis, evaluation and (local) treatment  Educated about red flag symptoms including (but not limited to) new, worsened, or persistent: fever greater than 100F, bowel or bladder incontinence, bowel obstipation, inability to void urine, urinary leakage, Severe nausea or vomiting, Worsening numbness, worsening tingling/paresthesias, and/or new or progressive motor weakness; advised to seek immediate medical attention at his nearest Emergency department should they experience any of the above  - Follow up in 3-4 months in person after continuing physical therapy  I have personally spent a total of at least 45 minutes preparing, reviewing internal and external records, explaining, counseling, and coordinating care for this patient encounter.  Thank you, (s), for allowing me to participate in the care of your patient. Please do not hesitate to contact me with questions/concerns.  Narendra Carlos M.D. Sports and Interventional Spine Department of Physical Medicine and Rehabilitation [facility] [address], [address] [place] [address]  Appointments: [phone] Fax: [phone]

## 2024-04-13 NOTE — PHYSICAL EXAM
[FreeTextEntry1] : Gen: A+O x 3 in NAD Psych: Normal mood and affect. Responds appropriately to commands Eyes: Anicteric. No discharge. EOMI. Resp: Breathing unlabored CV: DP pulses 2+ and equal. No varicosities noted Ext: No c/c/e Skin: No lesions noted  Gait: Non antalgic  +  reciprocating heel to toe able to stand on toes and heels WITHout hand holding Tandem gait intact WITHout hand holding Able to stand on either lower limb without LOB for 15 seconds, more difficult with left stance leg Romberg negative 10 calf raises on both extremities, same effort B/L  Neg Trendelenburg sign with single leg stance  Inspection: Spine alignment is midline. Palpation: There is + tenderness over the midline spinous processes, paravertebral muscles of the thoracolumbar region Lumbar ROM: Flexion, extension, side-bending, rotation, limited in most planes persistent 03/15/2024 pain with lateral flexion right > left persistent 03/15/2024 + pain with oblique extension to the right persistent 03/15/2024 + pain with lateral rotation   Hip ROM: neg pain at terminal ROM bilaterally. FAIR, FABERE negative bilaterally.  	Hip Flex       Knee Ext      Ankle Dorsi           EHL        Ankle Plantar Right	5/5	        5/5	                 5/5	          4-/5	             5/5                            Left	5/5	        5/5	                 5/5	          4-/5	             5/5                             Hip abduction R 4+/5 L 4/5 Hip adduction R 4+/5 L 4/5 Hip extension R 4+/5 L 4/5 Knee Flexion R 4+/5 L 4/5  Tone: Normal. No clonus. Sensation: Grossly intact to light touch bilateral lower limbs. Proprioception: Intact at big toes bilaterally. Reflexes: 2+ symmetric knee jerk, ankle jerk.  Plantars downgoing bilaterally. SLR negative bilaterally Crossed SLR negative bilaterally. Slump Test negative bilaterally  prone gapping test + B/L  yeoman + B L nachlas+ B/L active hip extension was more difficult on neither side persistent 03/15/2024 + stork B/L rossy finger + on two attempts  no hyper or hypo pigmentation to the skin of the low back minimal overlying xerosis near area of usual application

## 2024-04-17 RX ORDER — CABOTEGRAVIR AND RILPIVIRINE 600-900/3
600 & 900 KIT INTRAMUSCULAR
Qty: 1 | Refills: 0 | Status: ACTIVE | COMMUNITY
Start: 2024-04-17 | End: 1900-01-01

## 2024-04-24 NOTE — REVIEW OF SYSTEMS
"[facility] [facility] Care Plan    POC reviewed with Jun Mendoza and family at 0300. Pt verbalized understanding. Questions and concerns addressed. No acute events overnight. Pt progressing toward goals. Will continue to monitor. See below and flowsheets for full assessment and VS info.     NAEON. VSS. Neuro exam unchanged- slight RSW, back tenderness/pain, HA, otherwise intact.    PEC order in place. Pt pleasant and cooperative overnight. 1:1 sitter present.      Is this a stroke patient? no    Neuro:  Didi Coma Scale  Best Eye Response: 4-->(E4) spontaneous  Best Motor Response: 6-->(M6) obeys commands  Best Verbal Response: 5-->(V5) oriented  [place] Coma Scale Score: 15  Pupil PERRLA: yes     24hr Temp:  [98.1 °F (36.7 °C)-99.7 °F (37.6 °C)]     CV:   Rhythm: normal sinus rhythm  BP goals:   SBP <  <120  MAP > 65    Resp:           Plan: N/A    GI/:     Diet/Nutrition Received: NPO  Last Bowel Movement: 04/23/24  Voiding Characteristics: voids spontaneously without difficulty    Intake/Output Summary (Last 24 hours) at 4/24/2024 0633  Last data filed at 4/24/2024 0444  Gross per 24 hour   Intake 100 ml   Output 325 ml   Net -225 ml     Unmeasured Output  Urine Occurrence: 1    Labs/Accuchecks:  Recent Labs   Lab 04/24/24  0207   WBC 4.79   RBC 4.60   HGB 13.6*   HCT 40.6         Recent Labs   Lab 04/24/24  0207      K 3.9   CO2 22*      BUN 18   CREATININE 0.9   ALKPHOS 37*   ALT 16   AST 24   BILITOT 0.6      Recent Labs   Lab 04/23/24  2352   INR 1.1   APTT 25.1    No results for input(s): "CPK", "CPKMB", "TROPONINI", "MB" in the last 168 hours.    Electrolytes: Pt refused 2/2 burning- multiple attempts to decrease burning. No infiltration noted.   Accuchecks: none    Gtts:  Current Facility-Administered Medications   Medication Dose Route Frequency Last Rate Last Admin       LDA/Wounds:    Nurses Note -- 4 Eyes    Is there altered skin present? no       Prevention Measures Documented    Second RN/Staff " Member:      Restraints: n/a       WCTM       Problem: Violence Risk or Actual  Goal: Anger and Impulse Control  Outcome: Progressing     Problem: Adult Inpatient Plan of Care  Goal: Plan of Care Review  Outcome: Progressing  Goal: Patient-Specific Goal (Individualized)  Outcome: Progressing  Goal: Absence of Hospital-Acquired Illness or Injury  Outcome: Progressing  Goal: Optimal Comfort and Wellbeing  Outcome: Progressing  Goal: Readiness for Transition of Care  Outcome: Progressing     Problem: Stroke, Intracerebral Hemorrhage  Goal: Optimal Coping  Outcome: Progressing  Goal: Effective Bowel Elimination  Outcome: Progressing  Goal: Optimal Cerebral Tissue Perfusion  Outcome: Progressing  Goal: Optimal Cognitive Function  Outcome: Progressing  Goal: Effective Communication Skills  Outcome: Progressing  Goal: Optimal Functional Ability  Outcome: Progressing  Goal: Optimal Nutrition Intake  Outcome: Progressing  Goal: Optimal Pain Control and Function  Outcome: Progressing  Goal: Effective Oxygenation and Ventilation  Outcome: Progressing  Goal: Improved Sensorimotor Function  Outcome: Progressing  Goal: Safe and Effective Swallow  Outcome: Progressing  Goal: Effective Urinary Elimination  Outcome: Progressing     Problem: Skin Injury Risk Increased  Goal: Skin Health and Integrity  Outcome: Progressing      [Patient Intake Form Reviewed] : Patient intake form was reviewed

## 2024-05-09 ENCOUNTER — OUTPATIENT (OUTPATIENT)
Dept: OUTPATIENT SERVICES | Facility: HOSPITAL | Age: 54
LOS: 1 days | End: 2024-05-09

## 2024-05-09 ENCOUNTER — APPOINTMENT (OUTPATIENT)
Dept: INFECTIOUS DISEASE | Facility: CLINIC | Age: 54
End: 2024-05-09
Payer: COMMERCIAL

## 2024-05-09 PROCEDURE — 99211 OFF/OP EST MAY X REQ PHY/QHP: CPT | Mod: 25

## 2024-05-09 RX ORDER — CABOTEGRAVIR AND RILPIVIRINE 600-900/3
600 & 900 KIT INTRAMUSCULAR
Qty: 0 | Refills: 0 | Status: COMPLETED | OUTPATIENT
Start: 2024-05-09

## 2024-05-10 ENCOUNTER — APPOINTMENT (OUTPATIENT)
Dept: INFECTIOUS DISEASE | Facility: CLINIC | Age: 54
End: 2024-05-10

## 2024-05-10 DIAGNOSIS — B20 HUMAN IMMUNODEFICIENCY VIRUS [HIV] DISEASE: ICD-10-CM

## 2024-05-16 DIAGNOSIS — Z86.19 PERSONAL HISTORY OF OTHER INFECTIOUS AND PARASITIC DISEASES: ICD-10-CM

## 2024-05-16 LAB
FIBROSIS SCORE: 0.09
FIBROSIS STAGE: NORMAL
FIBROSURE ALPHA 2 MACROGLOBULINS: 128 MG/DL
FIBROSURE ALT (SGPT): 37 IU/L
FIBROSURE APOLIPOPROTEIN A1: 140 MG/DL
FIBROSURE COMMENT 2: NORMAL
FIBROSURE GGT: 14 IU/L
FIBROSURE HAPTOGLOBIN: 45 MG/DL
FIBROSURE INTERPRETATIONS: NORMAL
FIBROSURE LIMITATIONS: NORMAL
FIBROSURE NECROINFLAMM ACTIVITY SCORING: NORMAL
FIBROSURE NECROINFLAMMAT ACTIVITY GRPFIBROSURE NECROINFLAMMA: NORMAL
FIBROSURE NECROINFLAMMAT ACTIVITY SCORE: 0.15
FIBROSURE SCORING: NORMAL
FIBROSURE TOTAL BILIRUBIN: 0.2 MG/DL
Lab: NORMAL

## 2024-07-03 ENCOUNTER — RX RENEWAL (OUTPATIENT)
Age: 54
End: 2024-07-03

## 2024-07-10 ENCOUNTER — APPOINTMENT (OUTPATIENT)
Dept: INFECTIOUS DISEASE | Facility: CLINIC | Age: 54
End: 2024-07-10
Payer: COMMERCIAL

## 2024-07-10 ENCOUNTER — OUTPATIENT (OUTPATIENT)
Dept: OUTPATIENT SERVICES | Facility: HOSPITAL | Age: 54
LOS: 1 days | End: 2024-07-10

## 2024-07-10 DIAGNOSIS — B20 HUMAN IMMUNODEFICIENCY VIRUS [HIV] DISEASE: ICD-10-CM

## 2024-07-10 PROCEDURE — 99211 OFF/OP EST MAY X REQ PHY/QHP: CPT

## 2024-07-10 RX ORDER — CABOTEGRAVIR AND RILPIVIRINE 600-900/3
600 & 900 KIT INTRAMUSCULAR
Qty: 0 | Refills: 0 | Status: COMPLETED | OUTPATIENT
Start: 2024-07-10

## 2024-07-11 DIAGNOSIS — B20 HUMAN IMMUNODEFICIENCY VIRUS [HIV] DISEASE: ICD-10-CM

## 2024-08-26 ENCOUNTER — NON-APPOINTMENT (OUTPATIENT)
Age: 54
End: 2024-08-26

## 2024-08-27 ENCOUNTER — TRANSCRIPTION ENCOUNTER (OUTPATIENT)
Age: 54
End: 2024-08-27

## 2024-09-09 ENCOUNTER — APPOINTMENT (OUTPATIENT)
Dept: INTERNAL MEDICINE | Facility: CLINIC | Age: 54
End: 2024-09-09
Payer: MEDICAID

## 2024-09-09 ENCOUNTER — OUTPATIENT (OUTPATIENT)
Dept: OUTPATIENT SERVICES | Facility: HOSPITAL | Age: 54
LOS: 1 days | End: 2024-09-09

## 2024-09-09 VITALS
BODY MASS INDEX: 21.93 KG/M2 | HEART RATE: 93 BPM | DIASTOLIC BLOOD PRESSURE: 84 MMHG | RESPIRATION RATE: 14 BRPM | SYSTOLIC BLOOD PRESSURE: 114 MMHG | WEIGHT: 175.44 LBS | OXYGEN SATURATION: 98 % | TEMPERATURE: 98 F

## 2024-09-09 DIAGNOSIS — Z91.89 OTHER SPECIFIED PERSONAL RISK FACTORS, NOT ELSEWHERE CLASSIFIED: ICD-10-CM

## 2024-09-09 DIAGNOSIS — B20 HUMAN IMMUNODEFICIENCY VIRUS [HIV] DISEASE: ICD-10-CM

## 2024-09-09 DIAGNOSIS — Z78.9 OTHER SPECIFIED HEALTH STATUS: ICD-10-CM

## 2024-09-09 DIAGNOSIS — Z79.899 OTHER LONG TERM (CURRENT) DRUG THERAPY: ICD-10-CM

## 2024-09-09 DIAGNOSIS — U07.1 COVID-19: ICD-10-CM

## 2024-09-09 DIAGNOSIS — Z23 ENCOUNTER FOR IMMUNIZATION: ICD-10-CM

## 2024-09-09 DIAGNOSIS — Z82.0 FAMILY HISTORY OF EPILEPSY AND OTHER DISEASES OF THE NERVOUS SYSTEM: ICD-10-CM

## 2024-09-09 DIAGNOSIS — Z12.11 ENCOUNTER FOR SCREENING FOR MALIGNANT NEOPLASM OF COLON: ICD-10-CM

## 2024-09-09 PROCEDURE — G2211 COMPLEX E/M VISIT ADD ON: CPT | Mod: NC

## 2024-09-09 PROCEDURE — 99214 OFFICE O/P EST MOD 30 MIN: CPT | Mod: 25

## 2024-09-09 RX ORDER — CABOTEGRAVIR AND RILPIVIRINE 600-900/3
600 & 900 KIT INTRAMUSCULAR
Qty: 0 | Refills: 0 | Status: COMPLETED | OUTPATIENT
Start: 2024-09-06

## 2024-09-09 RX ORDER — ROSUVASTATIN CALCIUM 10 MG/1
10 TABLET, FILM COATED ORAL
Qty: 90 | Refills: 1 | Status: ACTIVE | COMMUNITY
Start: 2024-09-09 | End: 1900-01-01

## 2024-09-10 PROBLEM — Z82.0 FAMILY HISTORY OF ALZHEIMER'S DISEASE: Status: ACTIVE | Noted: 2024-09-09

## 2024-09-10 PROBLEM — Z91.89 AT RISK FOR HEART DISEASE: Status: ACTIVE | Noted: 2024-09-09

## 2024-09-10 LAB
BASOPHILS # BLD AUTO: 0.04 K/UL
BASOPHILS NFR BLD AUTO: 0.9 %
EOSINOPHIL # BLD AUTO: 0.06 K/UL
EOSINOPHIL NFR BLD AUTO: 1.3 %
HCT VFR BLD CALC: 42.6 %
HGB BLD-MCNC: 13.2 G/DL
IMM GRANULOCYTES NFR BLD AUTO: 0.2 %
LYMPHOCYTES # BLD AUTO: 1.71 K/UL
LYMPHOCYTES NFR BLD AUTO: 38 %
MAN DIFF?: NORMAL
MCHC RBC-ENTMCNC: 29.1 PG
MCHC RBC-ENTMCNC: 31 GM/DL
MCV RBC AUTO: 94 FL
MONOCYTES # BLD AUTO: 0.33 K/UL
MONOCYTES NFR BLD AUTO: 7.3 %
NEUTROPHILS # BLD AUTO: 2.35 K/UL
NEUTROPHILS NFR BLD AUTO: 52.3 %
PLATELET # BLD AUTO: 226 K/UL
RBC # BLD: 4.53 M/UL
RBC # FLD: 13.9 %
WBC # FLD AUTO: 4.5 K/UL

## 2024-09-10 NOTE — PHYSICAL EXAM
[Normal Sclera/Conjunctiva] : normal sclera/conjunctiva [PERRL] : pupils equal round and reactive to light [No Lymphadenopathy] : no lymphadenopathy [No Edema] : there was no peripheral edema [Soft] : abdomen soft [Non Tender] : non-tender [Non-distended] : non-distended [No Masses] : no abdominal mass palpated [No HSM] : no HSM [Normal] : no rash [Coordination Grossly Intact] : coordination grossly intact [No Focal Deficits] : no focal deficits [Normal Affect] : the affect was normal [Normal Insight/Judgement] : insight and judgment were intact

## 2024-09-10 NOTE — REVIEW OF SYSTEMS
[Fever] : no fever [Chills] : no chills [Night Sweats] : no night sweats [de-identified] : taste has not returned following COVID infection last year.

## 2024-09-10 NOTE — HISTORY OF PRESENT ILLNESS
[FreeTextEntry1] : F/U [de-identified] : first visit with Angelito. feeling well. happy with CAB injections but having to travel here from [place].  has daytime fatigue, napping more than ever before, ever since COVID last year. being more physically active does help him get through. diet is very healthy, cooks from own garden. no processed foods.  struggling with chronic back pain and radiculopathy

## 2024-09-10 NOTE — HISTORY OF PRESENT ILLNESS
[FreeTextEntry1] : F/U [de-identified] : first visit with Angelito. feeling well. happy with CAB injections but having to travel here from [place].  has daytime fatigue, napping more than ever before, ever since COVID last year. being more physically active does help him get through. diet is very healthy, cooks from own garden. no processed foods.  struggling with chronic back pain and radiculopathy

## 2024-09-10 NOTE — REVIEW OF SYSTEMS
[Fever] : no fever [Chills] : no chills [Night Sweats] : no night sweats [de-identified] : taste has not returned following COVID infection last year.

## 2024-09-11 ENCOUNTER — TRANSCRIPTION ENCOUNTER (OUTPATIENT)
Age: 54
End: 2024-09-11

## 2024-09-11 DIAGNOSIS — B20 HUMAN IMMUNODEFICIENCY VIRUS [HIV] DISEASE: ICD-10-CM

## 2024-09-11 LAB
ALBUMIN SERPL ELPH-MCNC: 5.1 G/DL
ALP BLD-CCNC: 63 U/L
ALT SERPL-CCNC: 25 U/L
ANION GAP SERPL CALC-SCNC: 12 MMOL/L
AST SERPL-CCNC: 28 U/L
BILIRUB SERPL-MCNC: 0.6 MG/DL
BUN SERPL-MCNC: 14 MG/DL
CALCIUM SERPL-MCNC: 9.9 MG/DL
CD3 CELLS # BLD: 1564 CELLS/UL
CD3 CELLS NFR BLD: 81 %
CD3+CD4+ CELLS # BLD: 378 CELLS/UL
CD3+CD4+ CELLS NFR BLD: 20 %
CD3+CD4+ CELLS/CD3+CD8+ CLL SPEC: 0.32 RATIO
CD3+CD8+ CELLS # SPEC: 1180 CELLS/UL
CD3+CD8+ CELLS NFR BLD: 61 %
CHLORIDE SERPL-SCNC: 101 MMOL/L
CHOLEST SERPL-MCNC: 253 MG/DL
CO2 SERPL-SCNC: 25 MMOL/L
CREAT SERPL-MCNC: 1.08 MG/DL
EGFR: 82 ML/MIN/1.73M2
GLUCOSE SERPL-MCNC: 103 MG/DL
HDLC SERPL-MCNC: 55 MG/DL
HIV1 RNA # SERPL NAA+PROBE: NORMAL
HIV1 RNA # SERPL NAA+PROBE: NORMAL COPIES/ML
LDLC SERPL CALC-MCNC: 173 MG/DL
NONHDLC SERPL-MCNC: 198 MG/DL
POTASSIUM SERPL-SCNC: 4.5 MMOL/L
PROT SERPL-MCNC: 7.6 G/DL
SODIUM SERPL-SCNC: 139 MMOL/L
TRIGL SERPL-MCNC: 142 MG/DL
VIRAL LOAD INTERP: NORMAL
VIRAL LOAD LOG: NORMAL LG COP/ML

## 2024-10-01 ENCOUNTER — TRANSCRIPTION ENCOUNTER (OUTPATIENT)
Age: 54
End: 2024-10-01

## 2024-10-01 DIAGNOSIS — U07.1 COVID-19: ICD-10-CM

## 2024-10-01 RX ORDER — NIRMATRELVIR AND RITONAVIR 300-100 MG
20 X 150 MG & KIT ORAL TWICE DAILY
Qty: 1 | Refills: 0 | Status: COMPLETED | COMMUNITY
Start: 2024-10-01 | End: 2024-10-06

## 2024-10-04 ENCOUNTER — TRANSCRIPTION ENCOUNTER (OUTPATIENT)
Age: 54
End: 2024-10-04

## 2024-10-24 ENCOUNTER — TRANSCRIPTION ENCOUNTER (OUTPATIENT)
Age: 54
End: 2024-10-24

## 2024-11-05 ENCOUNTER — OUTPATIENT (OUTPATIENT)
Dept: OUTPATIENT SERVICES | Facility: HOSPITAL | Age: 54
LOS: 1 days | End: 2024-11-05

## 2024-11-05 ENCOUNTER — APPOINTMENT (OUTPATIENT)
Dept: INFECTIOUS DISEASE | Facility: CLINIC | Age: 54
End: 2024-11-05

## 2024-11-05 ENCOUNTER — MED ADMIN CHARGE (OUTPATIENT)
Age: 54
End: 2024-11-05

## 2024-11-05 DIAGNOSIS — B20 HUMAN IMMUNODEFICIENCY VIRUS [HIV] DISEASE: ICD-10-CM

## 2024-11-05 RX ORDER — CABOTEGRAVIR AND RILPIVIRINE 600-900/3
600 & 900 KIT INTRAMUSCULAR
Qty: 0 | Refills: 0 | Status: COMPLETED | OUTPATIENT
Start: 2024-11-01

## 2024-11-11 DIAGNOSIS — B20 HUMAN IMMUNODEFICIENCY VIRUS [HIV] DISEASE: ICD-10-CM

## 2025-01-06 ENCOUNTER — OUTPATIENT (OUTPATIENT)
Dept: OUTPATIENT SERVICES | Facility: HOSPITAL | Age: 55
LOS: 1 days | End: 2025-01-06

## 2025-01-06 ENCOUNTER — APPOINTMENT (OUTPATIENT)
Dept: INTERNAL MEDICINE | Facility: CLINIC | Age: 55
End: 2025-01-06
Payer: MEDICAID

## 2025-01-06 VITALS
SYSTOLIC BLOOD PRESSURE: 137 MMHG | OXYGEN SATURATION: 97 % | TEMPERATURE: 97.8 F | HEART RATE: 89 BPM | BODY MASS INDEX: 22.37 KG/M2 | DIASTOLIC BLOOD PRESSURE: 95 MMHG | WEIGHT: 179 LBS

## 2025-01-06 DIAGNOSIS — Z11.3 ENCOUNTER FOR SCREENING FOR INFECTIONS WITH A PREDOMINANTLY SEXUAL MODE OF TRANSMISSION: ICD-10-CM

## 2025-01-06 DIAGNOSIS — Z87.39 PERSONAL HISTORY OF OTHER DISEASES OF THE MUSCULOSKELETAL SYSTEM AND CONNECTIVE TISSUE: ICD-10-CM

## 2025-01-06 DIAGNOSIS — M67.959 UNSPECIFIED DISORDER OF SYNOVIUM AND TENDON, UNSPECIFIED THIGH: ICD-10-CM

## 2025-01-06 DIAGNOSIS — M54.16 RADICULOPATHY, LUMBAR REGION: ICD-10-CM

## 2025-01-06 DIAGNOSIS — M62.89 OTHER SPECIFIED DISORDERS OF MUSCLE: ICD-10-CM

## 2025-01-06 DIAGNOSIS — Z23 ENCOUNTER FOR IMMUNIZATION: ICD-10-CM

## 2025-01-06 DIAGNOSIS — G25.81 RESTLESS LEGS SYNDROME: ICD-10-CM

## 2025-01-06 DIAGNOSIS — Z12.83 ENCOUNTER FOR SCREENING FOR MALIGNANT NEOPLASM OF SKIN: ICD-10-CM

## 2025-01-06 DIAGNOSIS — M53.3 SACROCOCCYGEAL DISORDERS, NOT ELSEWHERE CLASSIFIED: ICD-10-CM

## 2025-01-06 DIAGNOSIS — Z79.899 OTHER LONG TERM (CURRENT) DRUG THERAPY: ICD-10-CM

## 2025-01-06 DIAGNOSIS — M51.9 UNSPECIFIED THORACIC, THORACOLUMBAR AND LUMBOSACRAL INTERVERTEBRAL DISC DISORDER: ICD-10-CM

## 2025-01-06 DIAGNOSIS — U07.1 COVID-19: ICD-10-CM

## 2025-01-06 DIAGNOSIS — R03.0 ELEVATED BLOOD-PRESSURE READING, W/OUT DIAGNOSIS OF HYPERTENSION: ICD-10-CM

## 2025-01-06 DIAGNOSIS — B20 HUMAN IMMUNODEFICIENCY VIRUS [HIV] DISEASE: ICD-10-CM

## 2025-01-06 DIAGNOSIS — Z91.89 OTHER SPECIFIED PERSONAL RISK FACTORS, NOT ELSEWHERE CLASSIFIED: ICD-10-CM

## 2025-01-06 PROCEDURE — 99214 OFFICE O/P EST MOD 30 MIN: CPT | Mod: 25

## 2025-01-06 PROCEDURE — G2211 COMPLEX E/M VISIT ADD ON: CPT | Mod: NC

## 2025-01-06 RX ORDER — CABOTEGRAVIR AND RILPIVIRINE 600-900/3
600 & 900 KIT INTRAMUSCULAR
Qty: 0 | Refills: 0 | Status: COMPLETED | OUTPATIENT
Start: 2025-01-03

## 2025-01-07 ENCOUNTER — TRANSCRIPTION ENCOUNTER (OUTPATIENT)
Age: 55
End: 2025-01-07

## 2025-01-07 DIAGNOSIS — B20 HUMAN IMMUNODEFICIENCY VIRUS [HIV] DISEASE: ICD-10-CM

## 2025-01-07 PROBLEM — G25.81 RESTLESS LEGS: Status: ACTIVE | Noted: 2025-01-07

## 2025-01-07 LAB
ALBUMIN SERPL ELPH-MCNC: 4.5 G/DL
ALP BLD-CCNC: 50 U/L
ALT SERPL-CCNC: 24 U/L
ANION GAP SERPL CALC-SCNC: 9 MMOL/L
AST SERPL-CCNC: 24 U/L
BASOPHILS # BLD AUTO: 0.06 K/UL
BASOPHILS NFR BLD AUTO: 1.3 %
BILIRUB SERPL-MCNC: 0.4 MG/DL
BUN SERPL-MCNC: 17 MG/DL
CALCIUM SERPL-MCNC: 9.3 MG/DL
CD3 CELLS # BLD: 1628 CELLS/UL
CD3 CELLS NFR BLD: 71 %
CD3+CD4+ CELLS # BLD: 378 CELLS/UL
CD3+CD4+ CELLS NFR BLD: 16 %
CD3+CD4+ CELLS/CD3+CD8+ CLL SPEC: 0.3 RATIO
CD3+CD8+ CELLS # SPEC: 1251 CELLS/UL
CD3+CD8+ CELLS NFR BLD: 54 %
CHLORIDE SERPL-SCNC: 103 MMOL/L
CHOLEST SERPL-MCNC: 154 MG/DL
CO2 SERPL-SCNC: 26 MMOL/L
CREAT SERPL-MCNC: 1.03 MG/DL
EGFR: 86 ML/MIN/1.73M2
EOSINOPHIL # BLD AUTO: 0.14 K/UL
EOSINOPHIL NFR BLD AUTO: 3 %
GLUCOSE SERPL-MCNC: 107 MG/DL
HCT VFR BLD CALC: 39.7 %
HDLC SERPL-MCNC: 49 MG/DL
HGB BLD-MCNC: 13.2 G/DL
HIV1 RNA # SERPL NAA+PROBE: NORMAL
HIV1 RNA # SERPL NAA+PROBE: NORMAL COPIES/ML
IMM GRANULOCYTES NFR BLD AUTO: 0.2 %
LDLC SERPL CALC-MCNC: 84 MG/DL
LYMPHOCYTES # BLD AUTO: 2.21 K/UL
LYMPHOCYTES NFR BLD AUTO: 47.8 %
MAN DIFF?: NORMAL
MCHC RBC-ENTMCNC: 30.1 PG
MCHC RBC-ENTMCNC: 33.2 G/DL
MCV RBC AUTO: 90.4 FL
MONOCYTES # BLD AUTO: 0.46 K/UL
MONOCYTES NFR BLD AUTO: 10 %
NEUTROPHILS # BLD AUTO: 1.74 K/UL
NEUTROPHILS NFR BLD AUTO: 37.7 %
NONHDLC SERPL-MCNC: 105 MG/DL
PLATELET # BLD AUTO: 215 K/UL
POTASSIUM SERPL-SCNC: 4.8 MMOL/L
PROT SERPL-MCNC: 6.8 G/DL
RBC # BLD: 4.39 M/UL
RBC # FLD: 13.2 %
SODIUM SERPL-SCNC: 138 MMOL/L
TRIGL SERPL-MCNC: 116 MG/DL
VIRAL LOAD INTERP: NORMAL
VIRAL LOAD LOG: NORMAL LG COP/ML
WBC # FLD AUTO: 4.62 K/UL

## 2025-01-28 ENCOUNTER — TRANSCRIPTION ENCOUNTER (OUTPATIENT)
Age: 55
End: 2025-01-28

## 2025-01-29 ENCOUNTER — APPOINTMENT (OUTPATIENT)
Dept: INTERNAL MEDICINE | Facility: CLINIC | Age: 55
End: 2025-01-29
Payer: MEDICAID

## 2025-01-29 ENCOUNTER — OUTPATIENT (OUTPATIENT)
Dept: OUTPATIENT SERVICES | Facility: HOSPITAL | Age: 55
LOS: 1 days | End: 2025-01-29

## 2025-01-29 ENCOUNTER — TRANSCRIPTION ENCOUNTER (OUTPATIENT)
Age: 55
End: 2025-01-29

## 2025-01-29 VITALS
RESPIRATION RATE: 14 BRPM | OXYGEN SATURATION: 97 % | BODY MASS INDEX: 22.5 KG/M2 | DIASTOLIC BLOOD PRESSURE: 78 MMHG | WEIGHT: 180 LBS | SYSTOLIC BLOOD PRESSURE: 131 MMHG | HEART RATE: 99 BPM

## 2025-01-29 DIAGNOSIS — K62.89 OTHER SPECIFIED DISEASES OF ANUS AND RECTUM: ICD-10-CM

## 2025-01-29 DIAGNOSIS — Z11.3 ENCOUNTER FOR SCREENING FOR INFECTIONS WITH A PREDOMINANTLY SEXUAL MODE OF TRANSMISSION: ICD-10-CM

## 2025-01-29 DIAGNOSIS — Z72.51 HIGH RISK HETEROSEXUAL BEHAVIOR: ICD-10-CM

## 2025-01-29 PROCEDURE — 99214 OFFICE O/P EST MOD 30 MIN: CPT | Mod: 25

## 2025-01-29 RX ORDER — CEFTRIAXONE 500 MG/1
500 INJECTION, POWDER, FOR SOLUTION INTRAMUSCULAR; INTRAVENOUS
Qty: 0 | Refills: 0 | Status: COMPLETED | OUTPATIENT
Start: 2025-01-29

## 2025-01-29 RX ORDER — DOXYCYCLINE HYCLATE 100 MG/1
100 TABLET ORAL
Qty: 30 | Refills: 2 | Status: ACTIVE | COMMUNITY
Start: 2025-01-29 | End: 1900-01-01

## 2025-01-29 RX ADMIN — CEFTRIAXONE 0 MG: 500 INJECTION, POWDER, FOR SOLUTION INTRAMUSCULAR; INTRAVENOUS at 00:00

## 2025-01-31 ENCOUNTER — TRANSCRIPTION ENCOUNTER (OUTPATIENT)
Age: 55
End: 2025-01-31

## 2025-01-31 ENCOUNTER — APPOINTMENT (OUTPATIENT)
Dept: DERMATOLOGY | Facility: CLINIC | Age: 55
End: 2025-01-31
Payer: MEDICAID

## 2025-01-31 DIAGNOSIS — Z11.3 ENCOUNTER FOR SCREENING FOR INFECTIONS WITH A PREDOMINANTLY SEXUAL MODE OF TRANSMISSION: ICD-10-CM

## 2025-01-31 DIAGNOSIS — L57.0 ACTINIC KERATOSIS: ICD-10-CM

## 2025-01-31 LAB
C TRACH RRNA SPEC QL NAA+PROBE: NOT DETECTED
N GONORRHOEA RRNA SPEC QL NAA+PROBE: DETECTED
N GONORRHOEA RRNA SPEC QL NAA+PROBE: NOT DETECTED
N GONORRHOEA RRNA SPEC QL NAA+PROBE: NOT DETECTED
SOURCE AMPLIFICATION: NORMAL
SOURCE ANAL: NORMAL
SOURCE ORAL: NORMAL
T PALLIDUM AB SER QL IA: NEGATIVE

## 2025-01-31 PROCEDURE — 99214 OFFICE O/P EST MOD 30 MIN: CPT | Mod: 95

## 2025-01-31 RX ORDER — FLUOROURACIL 50 MG/G
5 CREAM TOPICAL
Qty: 2 | Refills: 1 | Status: ACTIVE | COMMUNITY
Start: 2025-01-31 | End: 1900-01-01

## 2025-03-03 ENCOUNTER — LABORATORY RESULT (OUTPATIENT)
Age: 55
End: 2025-03-03

## 2025-03-03 ENCOUNTER — APPOINTMENT (OUTPATIENT)
Dept: INTERNAL MEDICINE | Facility: CLINIC | Age: 55
End: 2025-03-03
Payer: MEDICAID

## 2025-03-03 ENCOUNTER — NON-APPOINTMENT (OUTPATIENT)
Age: 55
End: 2025-03-03

## 2025-03-03 ENCOUNTER — OUTPATIENT (OUTPATIENT)
Dept: OUTPATIENT SERVICES | Facility: HOSPITAL | Age: 55
LOS: 1 days | End: 2025-03-03

## 2025-03-03 VITALS
OXYGEN SATURATION: 98 % | HEART RATE: 90 BPM | DIASTOLIC BLOOD PRESSURE: 88 MMHG | WEIGHT: 184.19 LBS | RESPIRATION RATE: 14 BRPM | SYSTOLIC BLOOD PRESSURE: 107 MMHG | TEMPERATURE: 98.1 F | BODY MASS INDEX: 23.02 KG/M2

## 2025-03-03 DIAGNOSIS — A54.6 GONOCOCCAL INFECTION OF ANUS AND RECTUM: ICD-10-CM

## 2025-03-03 DIAGNOSIS — R53.83 OTHER FATIGUE: ICD-10-CM

## 2025-03-03 DIAGNOSIS — Z72.51 HIGH RISK HETEROSEXUAL BEHAVIOR: ICD-10-CM

## 2025-03-03 DIAGNOSIS — B20 HUMAN IMMUNODEFICIENCY VIRUS [HIV] DISEASE: ICD-10-CM

## 2025-03-03 DIAGNOSIS — Z23 ENCOUNTER FOR IMMUNIZATION: ICD-10-CM

## 2025-03-03 DIAGNOSIS — Z11.59 ENCOUNTER FOR SCREENING FOR OTHER VIRAL DISEASES: ICD-10-CM

## 2025-03-03 DIAGNOSIS — Z29.89 ENCOUNTER. FOR OTHER SPECIFIED PROPHYLACTIC MEASURES: ICD-10-CM

## 2025-03-03 DIAGNOSIS — Z87.19 PERSONAL HISTORY OF OTHER DISEASES OF THE DIGESTIVE SYSTEM: ICD-10-CM

## 2025-03-03 DIAGNOSIS — Z92.29 PERSONAL HISTORY OF OTHER DRUG THERAPY: ICD-10-CM

## 2025-03-03 LAB
BASOPHILS # BLD AUTO: 0.04 K/UL
BASOPHILS NFR BLD AUTO: 0.9 %
EOSINOPHIL # BLD AUTO: 0.1 K/UL
EOSINOPHIL NFR BLD AUTO: 2.2 %
ESTIMATED AVERAGE GLUCOSE: 108 MG/DL
HBA1C MFR BLD HPLC: 5.4 %
HCT VFR BLD CALC: 40.6 %
HGB BLD-MCNC: 13.4 G/DL
IMM GRANULOCYTES NFR BLD AUTO: 0 %
LYMPHOCYTES # BLD AUTO: 1.95 K/UL
LYMPHOCYTES NFR BLD AUTO: 43.1 %
MAN DIFF?: NORMAL
MCHC RBC-ENTMCNC: 30.1 PG
MCHC RBC-ENTMCNC: 33 G/DL
MCV RBC AUTO: 91.2 FL
MONOCYTES # BLD AUTO: 0.47 K/UL
MONOCYTES NFR BLD AUTO: 10.4 %
NEUTROPHILS # BLD AUTO: 1.96 K/UL
NEUTROPHILS NFR BLD AUTO: 43.4 %
PLATELET # BLD AUTO: 223 K/UL
RBC # BLD: 4.45 M/UL
RBC # FLD: 13 %
TSH SERPL-ACNC: 2.34 UIU/ML
WBC # FLD AUTO: 4.52 K/UL

## 2025-03-03 PROCEDURE — 99214 OFFICE O/P EST MOD 30 MIN: CPT | Mod: 25

## 2025-03-03 RX ORDER — CABOTEGRAVIR AND RILPIVIRINE 600-900/3
600 & 900 KIT INTRAMUSCULAR
Qty: 0 | Refills: 0 | Status: COMPLETED | OUTPATIENT
Start: 2025-02-28

## 2025-03-04 ENCOUNTER — TRANSCRIPTION ENCOUNTER (OUTPATIENT)
Age: 55
End: 2025-03-04

## 2025-03-04 DIAGNOSIS — E53.8 DEFICIENCY OF OTHER SPECIFIED B GROUP VITAMINS: ICD-10-CM

## 2025-03-04 LAB
25(OH)D3 SERPL-MCNC: 36 NG/ML
ALBUMIN SERPL ELPH-MCNC: 5.1 G/DL
ALP BLD-CCNC: 53 U/L
ALT SERPL-CCNC: 30 U/L
ANION GAP SERPL CALC-SCNC: 14 MMOL/L
AST SERPL-CCNC: 26 U/L
BILIRUB SERPL-MCNC: 0.3 MG/DL
BUN SERPL-MCNC: 13 MG/DL
CALCIUM SERPL-MCNC: 9.7 MG/DL
CHLORIDE SERPL-SCNC: 105 MMOL/L
CO2 SERPL-SCNC: 24 MMOL/L
CREAT SERPL-MCNC: 1.05 MG/DL
EGFRCR SERPLBLD CKD-EPI 2021: 84 ML/MIN/1.73M2
FOLATE SERPL-MCNC: 5.1 NG/ML
GLUCOSE SERPL-MCNC: 106 MG/DL
HIV1 RNA # SERPL NAA+PROBE: NORMAL
HIV1 RNA # SERPL NAA+PROBE: NORMAL COPIES/ML
MEV IGG FLD QL IA: 38.5 AU/ML
MEV IGG+IGM SER-IMP: POSITIVE
MUV AB SER-ACNC: NEGATIVE
MUV IGG SER QL IA: 6.85 AU/ML
POTASSIUM SERPL-SCNC: 4.7 MMOL/L
PROT SERPL-MCNC: 7.3 G/DL
RUBV IGG FLD-ACNC: 1.7 INDEX
RUBV IGG SER-IMP: POSITIVE
SODIUM SERPL-SCNC: 143 MMOL/L
T PALLIDUM AB SER QL IA: NEGATIVE
TESTOST SERPL-MCNC: 553 NG/DL
VIRAL LOAD INTERP: NORMAL
VIRAL LOAD LOG: NORMAL LG COP/ML
VIT B12 SERPL-MCNC: 457 PG/ML

## 2025-03-06 DIAGNOSIS — B20 HUMAN IMMUNODEFICIENCY VIRUS [HIV] DISEASE: ICD-10-CM

## 2025-03-06 LAB
C TRACH RRNA SPEC QL NAA+PROBE: NOT DETECTED
N GONORRHOEA RRNA SPEC QL NAA+PROBE: NOT DETECTED
SOURCE ANAL: NORMAL

## 2025-03-17 ENCOUNTER — RX RENEWAL (OUTPATIENT)
Age: 55
End: 2025-03-17

## 2025-03-31 ENCOUNTER — TRANSCRIPTION ENCOUNTER (OUTPATIENT)
Age: 55
End: 2025-03-31

## 2025-04-01 ENCOUNTER — RX RENEWAL (OUTPATIENT)
Age: 55
End: 2025-04-01

## 2025-04-14 ENCOUNTER — TRANSCRIPTION ENCOUNTER (OUTPATIENT)
Age: 55
End: 2025-04-14

## 2025-04-30 ENCOUNTER — TRANSCRIPTION ENCOUNTER (OUTPATIENT)
Age: 55
End: 2025-04-30

## 2025-05-01 ENCOUNTER — OUTPATIENT (OUTPATIENT)
Dept: OUTPATIENT SERVICES | Facility: HOSPITAL | Age: 55
LOS: 1 days | End: 2025-05-01

## 2025-05-01 ENCOUNTER — APPOINTMENT (OUTPATIENT)
Dept: INTERNAL MEDICINE | Facility: CLINIC | Age: 55
End: 2025-05-01
Payer: MEDICAID

## 2025-05-01 VITALS
WEIGHT: 178 LBS | TEMPERATURE: 97.4 F | SYSTOLIC BLOOD PRESSURE: 128 MMHG | OXYGEN SATURATION: 97 % | HEART RATE: 95 BPM | BODY MASS INDEX: 22.25 KG/M2 | DIASTOLIC BLOOD PRESSURE: 90 MMHG

## 2025-05-01 DIAGNOSIS — B20 HUMAN IMMUNODEFICIENCY VIRUS [HIV] DISEASE: ICD-10-CM

## 2025-05-01 DIAGNOSIS — Z29.89 ENCOUNTER. FOR OTHER SPECIFIED PROPHYLACTIC MEASURES: ICD-10-CM

## 2025-05-01 DIAGNOSIS — Z11.3 ENCOUNTER FOR SCREENING FOR INFECTIONS WITH A PREDOMINANTLY SEXUAL MODE OF TRANSMISSION: ICD-10-CM

## 2025-05-01 DIAGNOSIS — Z23 ENCOUNTER FOR IMMUNIZATION: ICD-10-CM

## 2025-05-01 DIAGNOSIS — Z86.19 PERSONAL HISTORY OF OTHER INFECTIOUS AND PARASITIC DISEASES: ICD-10-CM

## 2025-05-01 PROCEDURE — 99214 OFFICE O/P EST MOD 30 MIN: CPT | Mod: 25

## 2025-05-01 RX ORDER — CABOTEGRAVIR AND RILPIVIRINE 600-900/3
600 & 900 KIT INTRAMUSCULAR
Qty: 0 | Refills: 0 | Status: COMPLETED | OUTPATIENT
Start: 2025-04-25

## 2025-05-05 DIAGNOSIS — B20 HUMAN IMMUNODEFICIENCY VIRUS [HIV] DISEASE: ICD-10-CM

## 2025-06-26 ENCOUNTER — APPOINTMENT (OUTPATIENT)
Dept: INTERNAL MEDICINE | Facility: CLINIC | Age: 55
End: 2025-06-26

## 2025-06-26 ENCOUNTER — OUTPATIENT (OUTPATIENT)
Dept: OUTPATIENT SERVICES | Facility: HOSPITAL | Age: 55
LOS: 1 days | End: 2025-06-26

## 2025-06-26 VITALS
TEMPERATURE: 98.1 F | OXYGEN SATURATION: 97 % | SYSTOLIC BLOOD PRESSURE: 135 MMHG | BODY MASS INDEX: 22.15 KG/M2 | WEIGHT: 177.19 LBS | DIASTOLIC BLOOD PRESSURE: 75 MMHG | HEART RATE: 75 BPM

## 2025-06-26 DIAGNOSIS — B20 HUMAN IMMUNODEFICIENCY VIRUS [HIV] DISEASE: ICD-10-CM

## 2025-06-26 PROBLEM — Z12.12 SCREENING FOR CANCER OF THE RECTUM: Status: ACTIVE | Noted: 2025-06-26

## 2025-06-26 PROBLEM — Z11.59 MEASLES SCREENING: Status: RESOLVED | Noted: 2025-03-03 | Resolved: 2025-06-26

## 2025-06-26 PROCEDURE — 99214 OFFICE O/P EST MOD 30 MIN: CPT | Mod: GC,25

## 2025-06-26 RX ORDER — CABOTEGRAVIR AND RILPIVIRINE 600-900/3
600 & 900 KIT INTRAMUSCULAR
Qty: 0 | Refills: 0 | Status: COMPLETED | OUTPATIENT
Start: 2025-06-20

## 2025-06-27 ENCOUNTER — TRANSCRIPTION ENCOUNTER (OUTPATIENT)
Age: 55
End: 2025-06-27

## 2025-06-27 LAB
CD3 CELLS # BLD: 1396 CELLS/UL
CD3 CELLS NFR BLD: 70 %
CD3+CD4+ CELLS # BLD: 326 CELLS/UL
CD3+CD4+ CELLS NFR BLD: 16 %
CD3+CD4+ CELLS/CD3+CD8+ CLL SPEC: 0.3 RATIO
CD3+CD8+ CELLS # SPEC: 1079 CELLS/UL
CD3+CD8+ CELLS NFR BLD: 54 %
HIV1 RNA # SERPL NAA+PROBE: NORMAL
HIV1 RNA # SERPL NAA+PROBE: NORMAL COPIES/ML
VIABILITY: NORMAL
VIRAL LOAD INTERP: NORMAL
VIRAL LOAD LOG: NORMAL LG COP/ML

## 2025-06-30 ENCOUNTER — APPOINTMENT (OUTPATIENT)
Dept: DERMATOLOGY | Facility: CLINIC | Age: 55
End: 2025-06-30
Payer: MEDICAID

## 2025-06-30 LAB — HPV DNA, HIGH RISK, LOW RISK, ANAL: DETECTED

## 2025-06-30 PROCEDURE — 17003 DESTRUCT PREMALG LES 2-14: CPT

## 2025-06-30 PROCEDURE — 17000 DESTRUCT PREMALG LESION: CPT

## 2025-06-30 PROCEDURE — 99213 OFFICE O/P EST LOW 20 MIN: CPT | Mod: 25

## 2025-07-02 ENCOUNTER — TRANSCRIPTION ENCOUNTER (OUTPATIENT)
Age: 55
End: 2025-07-02

## 2025-07-02 PROBLEM — R85.612 LGSIL PAP SMEAR OF ANUS: Status: ACTIVE | Noted: 2025-07-02

## 2025-07-02 LAB — ANAL PAP CYTOLOGY: NORMAL

## 2025-07-03 ENCOUNTER — TRANSCRIPTION ENCOUNTER (OUTPATIENT)
Age: 55
End: 2025-07-03

## 2025-07-03 ENCOUNTER — NON-APPOINTMENT (OUTPATIENT)
Age: 55
End: 2025-07-03

## 2025-07-03 PROBLEM — D48.5 NEOPLASM OF UNCERTAIN BEHAVIOR OF SKIN: Status: ACTIVE | Noted: 2025-07-03

## 2025-07-09 ENCOUNTER — TRANSCRIPTION ENCOUNTER (OUTPATIENT)
Age: 55
End: 2025-07-09

## 2025-07-17 ENCOUNTER — TRANSCRIPTION ENCOUNTER (OUTPATIENT)
Age: 55
End: 2025-07-17

## 2025-08-11 ENCOUNTER — TRANSCRIPTION ENCOUNTER (OUTPATIENT)
Age: 55
End: 2025-08-11

## 2025-08-21 ENCOUNTER — APPOINTMENT (OUTPATIENT)
Dept: INTERNAL MEDICINE | Facility: CLINIC | Age: 55
End: 2025-08-21

## 2025-08-21 ENCOUNTER — OUTPATIENT (OUTPATIENT)
Dept: OUTPATIENT SERVICES | Facility: HOSPITAL | Age: 55
LOS: 1 days | End: 2025-08-21

## 2025-08-21 VITALS
HEART RATE: 90 BPM | WEIGHT: 176.31 LBS | OXYGEN SATURATION: 98 % | SYSTOLIC BLOOD PRESSURE: 118 MMHG | DIASTOLIC BLOOD PRESSURE: 84 MMHG | BODY MASS INDEX: 22.04 KG/M2 | TEMPERATURE: 98.1 F

## 2025-08-21 DIAGNOSIS — C44.91 BASAL CELL CARCINOMA OF SKIN, UNSPECIFIED: ICD-10-CM

## 2025-08-21 DIAGNOSIS — Z12.83 ENCOUNTER FOR SCREENING FOR MALIGNANT NEOPLASM OF SKIN: ICD-10-CM

## 2025-08-21 DIAGNOSIS — R85.610 ATYPICAL SQUAMOUS CELLS OF UNDETERMINED SIGNIFICANCE ON CYTOLOGIC SMEAR OF ANUS (ASC-US): ICD-10-CM

## 2025-08-21 DIAGNOSIS — Z23 ENCOUNTER FOR IMMUNIZATION: ICD-10-CM

## 2025-08-21 DIAGNOSIS — Z00.00 ENCOUNTER FOR GENERAL ADULT MEDICAL EXAMINATION W/OUT ABNORMAL FINDINGS: ICD-10-CM

## 2025-08-21 DIAGNOSIS — B20 HUMAN IMMUNODEFICIENCY VIRUS [HIV] DISEASE: ICD-10-CM

## 2025-08-21 PROCEDURE — 99214 OFFICE O/P EST MOD 30 MIN: CPT | Mod: GC,25

## 2025-08-21 RX ADMIN — CABOTEGRAVIR AND RILPIVIRINE 0 MG/3ML: KIT at 00:00

## 2025-08-22 ENCOUNTER — NON-APPOINTMENT (OUTPATIENT)
Age: 55
End: 2025-08-22

## 2025-08-22 ENCOUNTER — APPOINTMENT (OUTPATIENT)
Dept: COLORECTAL SURGERY | Facility: CLINIC | Age: 55
End: 2025-08-22

## 2025-08-22 VITALS
TEMPERATURE: 97.3 F | HEART RATE: 80 BPM | DIASTOLIC BLOOD PRESSURE: 85 MMHG | HEIGHT: 75 IN | WEIGHT: 173 LBS | BODY MASS INDEX: 21.51 KG/M2 | SYSTOLIC BLOOD PRESSURE: 126 MMHG

## 2025-08-22 DIAGNOSIS — K62.82 DYSPLASIA OF ANUS: ICD-10-CM

## 2025-08-22 PROCEDURE — 99203 OFFICE O/P NEW LOW 30 MIN: CPT | Mod: 25

## 2025-08-22 PROCEDURE — 46600 DIAGNOSTIC ANOSCOPY SPX: CPT

## 2025-08-28 ENCOUNTER — NON-APPOINTMENT (OUTPATIENT)
Age: 55
End: 2025-08-28

## 2025-08-28 ENCOUNTER — TRANSCRIPTION ENCOUNTER (OUTPATIENT)
Age: 55
End: 2025-08-28

## 2025-08-28 LAB — ANAL PAP CYTOLOGY: NORMAL

## 2025-09-06 ENCOUNTER — TRANSCRIPTION ENCOUNTER (OUTPATIENT)
Age: 55
End: 2025-09-06

## 2025-09-15 ENCOUNTER — TRANSCRIPTION ENCOUNTER (OUTPATIENT)
Age: 55
End: 2025-09-15

## 2025-09-19 ENCOUNTER — RX RENEWAL (OUTPATIENT)
Age: 55
End: 2025-09-19